# Patient Record
Sex: MALE | Race: WHITE | NOT HISPANIC OR LATINO | Employment: FULL TIME | URBAN - METROPOLITAN AREA
[De-identification: names, ages, dates, MRNs, and addresses within clinical notes are randomized per-mention and may not be internally consistent; named-entity substitution may affect disease eponyms.]

---

## 2017-04-11 ENCOUNTER — OFFICE VISIT (OUTPATIENT)
Dept: MEDICAL GROUP | Facility: MEDICAL CENTER | Age: 62
End: 2017-04-11
Payer: COMMERCIAL

## 2017-04-11 ENCOUNTER — TELEPHONE (OUTPATIENT)
Dept: MEDICAL GROUP | Facility: MEDICAL CENTER | Age: 62
End: 2017-04-11

## 2017-04-11 VITALS
SYSTOLIC BLOOD PRESSURE: 124 MMHG | OXYGEN SATURATION: 95 % | BODY MASS INDEX: 37.74 KG/M2 | HEIGHT: 63 IN | HEART RATE: 75 BPM | WEIGHT: 213 LBS | DIASTOLIC BLOOD PRESSURE: 78 MMHG | TEMPERATURE: 98.1 F

## 2017-04-11 DIAGNOSIS — J40 BRONCHITIS: ICD-10-CM

## 2017-04-11 DIAGNOSIS — J06.9 VIRAL URI WITH COUGH: ICD-10-CM

## 2017-04-11 DIAGNOSIS — E66.9 OBESITY, UNSPECIFIED OBESITY SEVERITY, UNSPECIFIED OBESITY TYPE: ICD-10-CM

## 2017-04-11 DIAGNOSIS — Z00.00 PREVENTATIVE HEALTH CARE: Chronic | ICD-10-CM

## 2017-04-11 DIAGNOSIS — Z23 NEED FOR ZOSTER VACCINATION: ICD-10-CM

## 2017-04-11 PROCEDURE — 99213 OFFICE O/P EST LOW 20 MIN: CPT | Performed by: INTERNAL MEDICINE

## 2017-04-11 RX ORDER — BENZONATATE 100 MG/1
100 CAPSULE ORAL 3 TIMES DAILY PRN
Qty: 30 CAP | Refills: 0 | Status: SHIPPED | OUTPATIENT
Start: 2017-04-11 | End: 2017-10-24

## 2017-04-11 RX ORDER — AZITHROMYCIN 250 MG/1
TABLET, FILM COATED ORAL
Qty: 6 TAB | Refills: 0 | Status: SHIPPED | OUTPATIENT
Start: 2017-04-11 | End: 2017-10-24

## 2017-04-11 RX ORDER — DOXYCYCLINE HYCLATE 100 MG
100 TABLET ORAL 2 TIMES DAILY
Qty: 20 TAB | Refills: 0 | Status: SHIPPED | OUTPATIENT
Start: 2017-04-11 | End: 2017-10-24

## 2017-04-11 ASSESSMENT — PATIENT HEALTH QUESTIONNAIRE - PHQ9: CLINICAL INTERPRETATION OF PHQ2 SCORE: 0

## 2017-04-11 NOTE — TELEPHONE ENCOUNTER
Please call patient, I did review his chart, he actually received the shingles vaccine here in 2014, he does not need to get it again

## 2017-04-11 NOTE — PROGRESS NOTES
Subjective:      Joaquin Escoto is a 61 y.o. male who presents with cough        HPI     Has had cough x 3 weeks, yellow sputum, no blood, some sob with activity, no chest pain, no fever or chills, decreased energy level,   No headaches, no body aches, some sinus congestion, no nausea or emesis, no diarrhea, has asthma and uses advair daily and only uses rescue inhaler with exericse, no usage of rescue inhaler in the last three weeks, no wheezing, no throat swelling  Cough is not worse at night, cough is not related to position or activity  Sick exposures at work  No recent travel, no recent antibiotics  Exercise 3-4 times per week and has    No tobacco  Dyslipidemia diet-controlled  No hypertension  No chest pain, shortness of breath, lightheaded, syncope, palpitation  No leg edema  No nausea or vomiting          Current Outpatient Prescriptions   Medication Sig Dispense Refill   • fluticasone-salmeterol (ADVAIR DISKUS) 100-50 MCG/DOSE AEROSOL POWDER, BREATH ACTIVATED Inhale 1 Puff by mouth 2 times a day. 60 Inhaler 3   • ciprofloxacin (CIPRO) 500 MG Tab Take 1 Tab by mouth 2 times a day. 20 Tab 0   • benzonatate (TESSALON) 100 MG Cap Take 1 Cap by mouth 3 times a day as needed. 30 Cap 0   • guaifenesin-codeine (TUSSI-ORGANIDIN NR) 100-10 MG/5ML syrup Take 5 mL by mouth 3 times a day as needed. 120 mL 0   • albuterol (VENTOLIN OR PROVENTIL) 108 (90 BASE) MCG/ACT Aero Soln inhalation aerosol Inhale 2 Puffs by mouth every 6 hours as needed for Shortness of Breath. 8.5 g 3   • aspirin (ASA) 81 MG CHEW Take 1 Tab by mouth every day. 100 Tab 11   • omeprazole (PRILOSEC) 20 MG CPDR Take 20 mg by mouth 2 times a day.       No current facility-administered medications for this visit.     Patient Active Problem List    Diagnosis Date Noted   • Polycythemia 04/26/2016   • Obesity 12/15/2015   • Sleep apnea    • Dyslipidemia 11/03/2012   • Mild intermittent asthma 10/23/2012   • Allergic rhinitis 10/23/2012    • S/P arthroscopy of left knee 10/23/2012   • S/P sinus surgery 10/23/2012   • Preventative health care 10/23/2012   • BPH (benign prostatic hypertrophy) 10/23/2012   • GERD (gastroesophageal reflux disease) 10/23/2012     Depression Screening    Little interest or pleasure in doing things?  0 - not at all  Feeling down, depressed , or hopeless? 0 - not at all  Patient Health Questionnaire Score: 0         ROS       Objective:          Physical Exam   Constitutional: He appears well-developed and well-nourished. No distress.   HENT:   Head: Normocephalic and atraumatic.   Right Ear: External ear normal.   Left Ear: External ear normal.   Mouth/Throat: Oropharynx is clear and moist.   Eyes: Conjunctivae are normal. Right eye exhibits no discharge. Left eye exhibits no discharge. No scleral icterus.   Neck: Neck supple. No JVD present.   Cardiovascular: Normal rate and regular rhythm.    No murmur heard.  Pulmonary/Chest: Effort normal and breath sounds normal. No respiratory distress. He has no wheezes.   Abdominal: He exhibits no distension.   Neurological: He is alert.   Skin: Skin is warm. He is not diaphoretic.   Psychiatric: He has a normal mood and affect. His behavior is normal.   Nursing note and vitals reviewed.   no sinus tenderness to palpation            Assessment/Plan:     Assessment  #1 bronchitis ×3 weeks with a history of underlying asthma, normal saturation, good air movement and air sounds lateral     #2 mild intermittent asthma stable on Advair, no use of rescue inhaler regularly    #3 obesity BMI 37.7, exercising but not following a good diet    #4 upcoming trip to [facility] would like antibiotics preventative    #5 dyslipidemia diet-controlled        Plan  #1 doxycycline ×10 days can cause nausea, vomiting, diarrhea, rash    #2 no need for chest x-ray at this time    #3 continue Advair    #4 Ventolin as needed    #5 no need for corticosteroids orally     #6 labs after resolution of symptoms  preventative    #7 exercise, weight loss discussed, recommended nutrition counseling he declines    #8 Zithromax for trip to [facility] take as directed has had before    #9 schedule follow-up annual exam 3 months    #10 continue no tobacco    #11 lifestyle modification, diet, exercise discussed

## 2017-04-11 NOTE — MR AVS SNAPSHOT
"        Joaquin Escoto   2017 2:40 PM   Office Visit   MRN: [id number]    Department:  [facility]   Dept Phone:  [phone]    Description:  Male : 1955   Provider:  Oj Estrada M.D.           Allergies as of 2017     Allergen Noted Reactions    Cashew Nut Oil 07/15/2014       Pcn [Penicillins] 10/23/2012   Anaphylaxis, Swelling      You were diagnosed with     Bronchitis   [599078]       Preventative health care   [610106]       Obesity, unspecified obesity severity, unspecified obesity type   [3638161]       Need for zoster vaccination   [461960]       Viral URI with cough   [756031]   use otc zyrtec and flonase daily.  flores w/cod and tessalon perles prn cough      Vital Signs     Blood Pressure Pulse Temperature Height Weight Body Mass Index    124/78 mmHg 75 36.7 °C (98.1 °F) 1.6 m (5' 2.99\") 96.616 kg (213 lb) 37.74 kg/m2    Oxygen Saturation Smoking Status                95% Former Smoker          Basic Information     Date Of Birth Sex Race Ethnicity Preferred Language    1955 Male White Non- English      Problem List              ICD-10-CM Priority Class Noted - Resolved    Mild intermittent asthma J45.20   10/23/2012 - Present    Allergic rhinitis    10/23/2012 - Present    S/P arthroscopy of left knee (Chronic) Z98.890   10/23/2012 - Present    S/P sinus surgery (Chronic) Z98.890   10/23/2012 - Present    Preventative health care (Chronic) Z00.00   10/23/2012 - Present    BPH (benign prostatic hypertrophy) (Chronic) N40.0   10/23/2012 - Present    GERD (gastroesophageal reflux disease) (Chronic) K21.9   10/23/2012 - Present    Dyslipidemia (Chronic) E78.5   11/3/2012 - Present    Sleep apnea G47.30   Unknown - Present    Obesity E66.9   12/15/2015 - Present    Polycythemia D75.1   2016 - Present      Health Maintenance        Date Due Completion Dates    IMM DTaP/Tdap/Td Vaccine (1 - Tdap) 1973 ---    IMM PNEUMOCOCCAL 19-64 (ADULT) MEDIUM RISK SERIES " (1 of 1 - PPSV23) 8/14/1973 ---    COLONOSCOPY 5/16/2019 5/16/2009 (Done)    Override on 5/16/2009: Done            Current Immunizations     Influenza TIV (IM) 10/23/2012    Influenza Vaccine Quad Inj (Pf) 12/15/2015  4:13 PM    SHINGLES VACCINE 7/15/2014      Below and/or attached are the medications your provider expects you to take. Review all of your home medications and newly ordered medications with your provider and/or pharmacist. Follow medication instructions as directed by your provider and/or pharmacist. Please keep your medication list with you and share with your provider. Update the information when medications are discontinued, doses are changed, or new medications (including over-the-counter products) are added; and carry medication information at all times in the event of emergency situations     Allergies:  CASHEW NUT OIL - (reactions not documented)     PCN - Anaphylaxis,Swelling               Medications  Valid as of: April 11, 2017 -  3:07 PM    Generic Name Brand Name Tablet Size Instructions for use    Albuterol Sulfate (Aero Soln) albuterol 108 (90 BASE) MCG/ACT Inhale 2 Puffs by mouth every 6 hours as needed for Shortness of Breath.        Aspirin (Chew Tab) ASA 81 MG Take 1 Tab by mouth every day.        Azithromycin (Tab) ZITHROMAX 250 MG 2 tabs by mouth day 1, 1 tab by mouth days 2-5        Benzonatate (Cap) TESSALON 100 MG Take 1 Cap by mouth 3 times a day as needed.        Doxycycline Hyclate (Tab) VIBRAMYCIN 100 MG Take 1 Tab by mouth 2 times a day.        Fluticasone-Salmeterol (AEROSOL POWDER, BREATH ACTIVATED) ADVAIR 100-50 MCG/DOSE Inhale 1 Puff by mouth 2 times a day.        Omeprazole (CAPSULE DELAYED RELEASE) PRILOSEC 20 MG Take 20 mg by mouth 2 times a day.        Zoster Vaccine Live (Recon Soln) ZOSTAVAX 79664 UNT/0.65ML Inject 0.65 mL as instructed Once for 1 dose.        .                 Medicines prescribed today were sent to:     [id number] - [place], [place] - [address]    [address] [place] [address]    Phone: [phone] Fax: [phone]    Open 24 Hours?: No      Medication refill instructions:       If your prescription bottle indicates you have medication refills left, it is not necessary to call your provider’s office. Please contact your pharmacy and they will refill your medication.    If your prescription bottle indicates you do not have any refills left, you may request refills at any time through one of the following ways: The online [employer] system (except Urgent Care), by calling your provider’s office, or by asking your pharmacy to contact your provider’s office with a refill request. Medication refills are processed only during regular business hours and may not be available until the next business day. Your provider may request additional information or to have a follow-up visit with you prior to refilling your medication.   *Please Note: Medication refills are assigned a new Rx number when refilled electronically. Your pharmacy may indicate that no refills were authorized even though a new prescription for the same medication is available at the pharmacy. Please request the medicine by name with the pharmacy before contacting your provider for a refill.        Your To Do List     Future Labs/Procedures Complete By Expires    CBC WITH DIFFERENTIAL  As directed 4/12/2018    COMP METABOLIC PANEL  As directed 4/12/2018    HEMOGLOBIN A1C  As directed 4/11/2018    LIPID PROFILE  As directed 4/12/2018    PROSTATE SPECIFIC AG SCREENING  As directed 4/12/2018    TSH  As directed 4/12/2018    URINALYSIS,CULTURE IF INDICATED  As directed 4/12/2018    VITAMIN D,25 HYDROXY  As directed 4/12/2018      Other Notes About Your Plan        12/15/15 check blood pressure and record    9/16 next labs cbc,EPO,jak2 ,a1c,lipid             [employer] Access Code: Activation code not generated  Current [employer] Status: Active

## 2017-10-21 LAB
25(OH)D3+25(OH)D2 SERPL-MCNC: 26.2 NG/ML (ref 30–100)
ALBUMIN SERPL-MCNC: 4.1 G/DL (ref 3.6–4.8)
ALBUMIN/GLOB SERPL: 1.8 {RATIO} (ref 1.2–2.2)
ALP SERPL-CCNC: 75 IU/L (ref 39–117)
ALT SERPL-CCNC: 27 IU/L (ref 0–44)
APPEARANCE UR: CLEAR
AST SERPL-CCNC: 19 IU/L (ref 0–40)
BACTERIA #/AREA URNS HPF: ABNORMAL /[HPF]
BACTERIA UR CULT: NO GROWTH
BACTERIA UR CULT: NORMAL
BASOPHILS # BLD AUTO: 0 X10E3/UL (ref 0–0.2)
BASOPHILS NFR BLD AUTO: 0 %
BILIRUB SERPL-MCNC: 0.5 MG/DL (ref 0–1.2)
BILIRUB UR QL STRIP: NEGATIVE
BUN SERPL-MCNC: 24 MG/DL (ref 8–27)
BUN/CREAT SERPL: 20 (ref 10–24)
CALCIUM SERPL-MCNC: 8.8 MG/DL (ref 8.6–10.2)
CASTS URNS MICRO: ABNORMAL
CASTS URNS QL MICRO: ABNORMAL
CHLORIDE SERPL-SCNC: 101 MMOL/L (ref 96–106)
CHOLEST SERPL-MCNC: 219 MG/DL (ref 100–199)
CO2 SERPL-SCNC: 22 MMOL/L (ref 18–29)
COLOR UR: YELLOW
COMMENT 011824: ABNORMAL
CREAT SERPL-MCNC: 1.21 MG/DL (ref 0.76–1.27)
CRYSTALS URNS MICRO: ABNORMAL
EOSINOPHIL # BLD AUTO: 0.1 X10E3/UL (ref 0–0.4)
EOSINOPHIL NFR BLD AUTO: 2 %
EPI CELLS #/AREA URNS HPF: >10 /HPF
ERYTHROCYTE [DISTWIDTH] IN BLOOD BY AUTOMATED COUNT: 13.6 % (ref 12.3–15.4)
GFR SERPLBLD CREATININE-BSD FMLA CKD-EPI: 63 ML/MIN/1.73
GFR SERPLBLD CREATININE-BSD FMLA CKD-EPI: 73 ML/MIN/1.73
GLOBULIN SER CALC-MCNC: 2.3 G/DL (ref 1.5–4.5)
GLUCOSE SERPL-MCNC: 113 MG/DL (ref 65–99)
GLUCOSE UR QL: NEGATIVE
HBA1C MFR BLD: 6 % (ref 4.8–5.6)
HCT VFR BLD AUTO: 51.7 % (ref 37.5–51)
HDLC SERPL-MCNC: 41 MG/DL
HGB BLD-MCNC: 18.3 G/DL (ref 12.6–17.7)
HGB UR QL STRIP: ABNORMAL
IMM GRANULOCYTES # BLD: 0 X10E3/UL (ref 0–0.1)
IMM GRANULOCYTES NFR BLD: 0 %
IMMATURE CELLS  115398: ABNORMAL
KETONES UR QL STRIP: NEGATIVE
LDLC SERPL CALC-MCNC: 146 MG/DL (ref 0–99)
LEUKOCYTE ESTERASE UR QL STRIP: NEGATIVE
LYMPHOCYTES # BLD AUTO: 2 X10E3/UL (ref 0.7–3.1)
LYMPHOCYTES NFR BLD AUTO: 33 %
MCH RBC QN AUTO: 31.9 PG (ref 26.6–33)
MCHC RBC AUTO-ENTMCNC: 35.4 G/DL (ref 31.5–35.7)
MCV RBC AUTO: 90 FL (ref 79–97)
MICRO URNS: ABNORMAL
MICRO URNS: ABNORMAL
MONOCYTES # BLD AUTO: 0.4 X10E3/UL (ref 0.1–0.9)
MONOCYTES NFR BLD AUTO: 6 %
MORPHOLOGY BLD-IMP: ABNORMAL
MUCOUS THREADS URNS QL MICRO: PRESENT
NEUTROPHILS # BLD AUTO: 3.6 X10E3/UL (ref 1.4–7)
NEUTROPHILS NFR BLD AUTO: 59 %
NITRITE UR QL STRIP: NEGATIVE
NRBC BLD AUTO-RTO: ABNORMAL %
PH UR STRIP: 6 [PH] (ref 5–7.5)
PLATELET # BLD AUTO: 193 X10E3/UL (ref 150–379)
POTASSIUM SERPL-SCNC: 4.2 MMOL/L (ref 3.5–5.2)
PROT SERPL-MCNC: 6.4 G/DL (ref 6–8.5)
PROT UR QL STRIP: NEGATIVE
PSA SERPL-MCNC: 2.1 NG/ML (ref 0–4)
RBC # BLD AUTO: 5.74 X10E6/UL (ref 4.14–5.8)
RBC #/AREA URNS HPF: >30 /HPF
RENAL EPI CELLS #/AREA URNS HPF: ABNORMAL /[HPF]
SODIUM SERPL-SCNC: 139 MMOL/L (ref 134–144)
SP GR UR: 1.03 (ref 1–1.03)
T VAGINALIS URNS QL MICRO: ABNORMAL
TRIGL SERPL-MCNC: 160 MG/DL (ref 0–149)
TSH SERPL DL<=0.005 MIU/L-ACNC: 2.5 UIU/ML (ref 0.45–4.5)
UNIDENT CRYS URNS QL MICRO: PRESENT
URINALYSIS REFLEX  377202: ABNORMAL
URNS CMNT MICRO: ABNORMAL
UROBILINOGEN UR STRIP-MCNC: 0.2 MG/DL (ref 0.2–1)
VLDLC SERPL CALC-MCNC: 32 MG/DL (ref 5–40)
WBC # BLD AUTO: 6.2 X10E3/UL (ref 3.4–10.8)
WBC #/AREA URNS HPF: >30 /HPF
YEAST #/AREA URNS HPF: ABNORMAL /[HPF]

## 2017-10-23 PROBLEM — R73.01 IMPAIRED FASTING GLUCOSE: Status: ACTIVE | Noted: 2017-10-23

## 2017-10-24 ENCOUNTER — HOSPITAL ENCOUNTER (OUTPATIENT)
Facility: MEDICAL CENTER | Age: 62
End: 2017-10-24
Attending: INTERNAL MEDICINE
Payer: COMMERCIAL

## 2017-10-24 ENCOUNTER — TELEPHONE (OUTPATIENT)
Dept: MEDICAL GROUP | Facility: MEDICAL CENTER | Age: 62
End: 2017-10-24

## 2017-10-24 ENCOUNTER — OFFICE VISIT (OUTPATIENT)
Dept: MEDICAL GROUP | Facility: MEDICAL CENTER | Age: 62
End: 2017-10-24
Payer: COMMERCIAL

## 2017-10-24 VITALS
OXYGEN SATURATION: 95 % | BODY MASS INDEX: 38.45 KG/M2 | TEMPERATURE: 98 F | DIASTOLIC BLOOD PRESSURE: 86 MMHG | HEART RATE: 72 BPM | WEIGHT: 217 LBS | SYSTOLIC BLOOD PRESSURE: 136 MMHG | HEIGHT: 63 IN

## 2017-10-24 DIAGNOSIS — R73.01 IMPAIRED FASTING GLUCOSE: ICD-10-CM

## 2017-10-24 DIAGNOSIS — E78.5 DYSLIPIDEMIA: Chronic | ICD-10-CM

## 2017-10-24 DIAGNOSIS — R42 DIZZINESS: ICD-10-CM

## 2017-10-24 DIAGNOSIS — K21.9 GASTROESOPHAGEAL REFLUX DISEASE WITHOUT ESOPHAGITIS: Chronic | ICD-10-CM

## 2017-10-24 DIAGNOSIS — Z12.11 COLON CANCER SCREENING: ICD-10-CM

## 2017-10-24 DIAGNOSIS — R06.02 SOB (SHORTNESS OF BREATH): ICD-10-CM

## 2017-10-24 DIAGNOSIS — J45.20 MILD INTERMITTENT ASTHMA WITHOUT COMPLICATION: ICD-10-CM

## 2017-10-24 DIAGNOSIS — G47.30 SLEEP APNEA, UNSPECIFIED TYPE: ICD-10-CM

## 2017-10-24 DIAGNOSIS — R31.29 HEMATURIA, MICROSCOPIC: ICD-10-CM

## 2017-10-24 DIAGNOSIS — E66.9 CLASS 2 OBESITY WITHOUT SERIOUS COMORBIDITY WITH BODY MASS INDEX (BMI) OF 38.0 TO 38.9 IN ADULT, UNSPECIFIED OBESITY TYPE: ICD-10-CM

## 2017-10-24 DIAGNOSIS — D75.1 POLYCYTHEMIA: ICD-10-CM

## 2017-10-24 PROCEDURE — 99214 OFFICE O/P EST MOD 30 MIN: CPT | Performed by: INTERNAL MEDICINE

## 2017-10-24 NOTE — TELEPHONE ENCOUNTER
I would recommend calling [facility] [phone] to see how we can best order the echocardiogram and treadmill stress test.  Would this be done through [facility]?  Or can this be done as an outpatient through the cardiology office at [facility]?    Where should we fax the orders for the echocardiogram and treadmill stress test?

## 2017-10-24 NOTE — TELEPHONE ENCOUNTER
Please call the patient, I saw him earlier today and ordered a treadmill stress test and echocardiogram, his insurance does not cover [facility].     Please ask him what imaging center is covered under his insurance ([facility]? ).

## 2017-10-24 NOTE — PROGRESS NOTES
Subjective:      Joaquin Escoto is a 62 y.o. male who presents with lab review  Follow-Up            HPI   Here to review labs results 10/23/17  Has been using advair daily no use of rescue inhaler, no cough or sob, no wheezing, no sputum, fever or chills, not exercising but does eat sweets, soda one per day and mocha coffee.  No sore throat, no postnasal drip or allergies.  Not able to exercise because of business, has not exercised a regular basis for 6 months, previous stress testing done 3 years ago negative  Dyslipidemia diet-controlled, impaired fasting blood sugar, no history of hypertension  Has not had influenza vaccine   Occasional snoring in the past, has declined evaluation for sleep apnea, occasional daytime fatigue  Has some dizziness with standing sometimes going from sitting to standing position happens rarely, does not occur all the time usually only lasting a few seconds, no history of arrhythmia, no associated chest pain, no sob, no cough, no headache with position change, no lightheadedness, dizziness, palpitations, chest pain with exercise   Occasional shortness of breath with activity, does not happen all the time  Left first finger cyst  No tobacco, no alcohol  No dysuria, hematuria, urgency    Current Outpatient Prescriptions   Medication Sig Dispense Refill   • fluticasone-salmeterol (ADVAIR DISKUS) 100-50 MCG/DOSE AEROSOL POWDER, BREATH ACTIVATED Inhale 1 Puff by mouth 2 times a day. 60 Inhaler 6   • omeprazole (PRILOSEC) 20 MG CPDR Take 20 mg by mouth 2 times a day.     • doxycycline (VIBRAMYCIN) 100 MG Tab Take 1 Tab by mouth 2 times a day. 20 Tab 0   • azithromycin (ZITHROMAX Z-GEORGE) 250 MG Tab 2 tabs by mouth day 1, 1 tab by mouth days 2-5 6 Tab 0   • benzonatate (TESSALON) 100 MG Cap Take 1 Cap by mouth 3 times a day as needed. 30 Cap 0   • albuterol (VENTOLIN OR PROVENTIL) 108 (90 BASE) MCG/ACT Aero Soln inhalation aerosol Inhale 2 Puffs by mouth every 6 hours as needed for Shortness  of Breath. 8.5 g 3   • aspirin (ASA) 81 MG CHEW Take 1 Tab by mouth every day. 100 Tab 11     No current facility-administered medications for this visit.      Patient Active Problem List    Diagnosis Date Noted   • Impaired fasting glucose 10/23/2017   • Polycythemia 04/26/2016   • Obesity 12/15/2015   • Sleep apnea    • Dyslipidemia 11/03/2012   • Mild intermittent asthma 10/23/2012   • Allergic rhinitis 10/23/2012   • S/P arthroscopy of left knee 10/23/2012   • S/P sinus surgery 10/23/2012   • Preventative health care 10/23/2012   • BPH (benign prostatic hypertrophy) 10/23/2012   • GERD (gastroesophageal reflux disease) 10/23/2012         Allergic rhinitis  2007 did see  for allergy testing     BPH  Saw dr.garey aldrich urology had microwave procedure   3/8/13 psa 2.5 per urology note  5/1/14 psa 1.8  6/11/15 urology [place] note, s/p TUMT several years ago, psa stable, digital rectal exam normal, followup one year  4/26/16 psa 2.7  10/23/17 psa 2.1     Dyslipidemia  6/09 exercise thallium at [facility], no ischemia, EF 63%  4/11 chol 220,trig 204,hdl 41,ldl 138  5/1/14 chol 191,trig 76,hdl 46,ldl 130,LDL-P 1303,HDL-P 27.9,LP-IR 49; CRP 1.2, Lp(a) 67, urine mac < 0.5  5/16/14 [facility]; treadmill stress test 85% maximum predicted heart rate, no ST depression, solitary PVC, no arrhythmia, negative stress test  5/16/14 CT cardiac scoring; LMA-0, LCX-1.7, LAD 10.8, RCA-6.3, PDA-0; total calcium score 18.8  5/16/15 carotid ultrasound <50% stenosis  5/16/14 DES negative  5/16/14 ultrasound aorta negative  4/26/16 chol 219,trig 134,hdl 42,ldl 150  10/23/17 chol 219,trig 160,hdl 41,ldl 146      Gastroesophageal reflux  On prilosec daily  3/10 EGD per [facility]     Impaired fasting blood sugar  10/23/17 A1c 6.%,bs 113     Mild intermittent asthma    Polycythemia  5/1/14 hgb 18,hct 53.2  4/26/16 hgb 18,hct 51.7  10/23/17 hgb 18,hct 51.7     Preventative health  11/24/08 colon [facility] polyp  "hyperplastic, repeat 10 years  2010 pneumovax no webiz  2/7/12 tdap at [facility]   8/9/12 hep a and b series completed  5/2/14 FIT neg  7/15/14 zoster vaccine  10/23/17 psa 2.1  10/23/17 vit d 26 start 2000 units     Left knee arthroscopy 2010     Sleep apnea  had ENT surgery/uvulectomy  7/21/14 apnea link AHI 5,RI 7,hypopnea index 5, evaluation over 5 hours 44 min  8/7/14 patient declines sleep study     s/p sinus surgery      ROS       Objective:     /86   Pulse 72   Temp 36.7 °C (98 °F)   Ht 1.6 m (5' 3\")   Wt 98.4 kg (217 lb)   SpO2 95%   BMI 38.44 kg/m²      Physical Exam   Constitutional: He appears well-developed and well-nourished. No distress.   HENT:   Head: Normocephalic and atraumatic.   Right Ear: External ear normal.   Left Ear: External ear normal.   Eyes: Conjunctivae are normal. Right eye exhibits no discharge. Left eye exhibits no discharge. No scleral icterus.   Neck: Neck supple. No JVD present.   Cardiovascular: Normal rate and regular rhythm.    Pulmonary/Chest: Effort normal and breath sounds normal. No respiratory distress.   Abdominal: He exhibits no distension.   Musculoskeletal: He exhibits no edema.   Neurological: He is alert.   Skin: Skin is warm. He is not diaphoretic.   Psychiatric: He has a normal mood and affect. His behavior is normal.   Nursing note and vitals reviewed.    Left first finger ganglion cyst          Assessment/Plan:     Assessment  #! DyslipidemiaDiet-controlled 10/23/17 chol 219,trig 160,hdl 41,ldl 146      #2 Impaired fasting blood sugar 10/23/17 A1c 6.%,bs 113 diet-controlled     #3 Mild intermittent asthma occasional shortness of breath with activity, no use of breast inhaler    #4 Polycythemia 10/23/17 hgb 18,hct 51.7 question sleep apnea, pickwickian, no history of CHF or cor pulmonale     #5 consider sleep apnea 7/21/14 apnea link AHI 5,RI 7,hypopnea index 5, evaluation over 5 hours 44 min, history of snoring, daytime fatigue    #6 BMI 38.4    #7 " left first finger ganglion cyst    #8 dizziness when clear from sitting to standing position    #9 occasional shortness of breath with activity consider related to asthma, deconditioning, cardiovascular disease      Plan  #! Declines nutrition consultaion    #2 importance of diet and exercise and weight loss discussed, patient understands that     #3 increase vit d by 2000 units per day    #4 apnea link    #5 repeat UA his urinalysis was negative on microscopic revealed rbc and wbc, asymptomatic with regards to urinary symptoms    #6 echo shortness of breath    #7 chest x-ray    #8 treadmill stress test shortness of breath with cardiovascular risk factors    #9 left finger ganglion cyst, can refer to orthopedics but would be cosmetic, he declines    #10 check blood pressure regularly and record; orthostatic precautions    #11 he will get influenza vaccine at pharmacy we are out    #12 continue Advair    #13 reviewed laboratory testing from October 23    #14 follow-up 4 weeks    #15 FIT card provided    #16 repeat A1c 3 months

## 2017-10-24 NOTE — TELEPHONE ENCOUNTER
Spoke with pt, he must use [facility]. Pt advised that they will be rerouted to [facility] and they should notify him within about 5 days, if not he is to call office and we will check on them.

## 2017-10-25 NOTE — TELEPHONE ENCOUNTER
Noted, two separate orders written and faxed to the individual fax numbers for a treadmill stress test and for the echocardiogram at [facility]

## 2017-10-25 NOTE — TELEPHONE ENCOUNTER
Spoke with [facility]. If stress test is for an exercise test, send to fax: [phone]    For echocardiogram and a nuclear med stress test, send to fax: [phone]

## 2017-10-26 LAB
BACTERIA UR CULT: NO GROWTH
BACTERIA UR CULT: NORMAL

## 2017-10-27 LAB
APPEARANCE UR: NORMAL
BILIRUB UR QL STRIP: NORMAL
COLOR UR: NORMAL
GLUCOSE UR QL: NORMAL
HGB UR QL STRIP: NORMAL
KETONES UR QL STRIP: NORMAL
LEUKOCYTE ESTERASE UR QL STRIP: NORMAL
MICRO URNS: NORMAL
NITRITE UR QL STRIP: NORMAL
PH UR STRIP: NORMAL [PH]
PROT UR QL STRIP: NORMAL
REQUEST PROBLEM   100552: NORMAL
SP GR UR: NORMAL
UROBILINOGEN UR STRIP-MCNC: NORMAL MG/DL

## 2017-11-06 ENCOUNTER — HOSPITAL ENCOUNTER (OUTPATIENT)
Facility: MEDICAL CENTER | Age: 62
End: 2017-11-06
Attending: INTERNAL MEDICINE
Payer: COMMERCIAL

## 2017-11-06 PROCEDURE — 82274 ASSAY TEST FOR BLOOD FECAL: CPT

## 2017-11-09 ENCOUNTER — TELEPHONE (OUTPATIENT)
Dept: MEDICAL GROUP | Facility: MEDICAL CENTER | Age: 62
End: 2017-11-09

## 2017-11-09 DIAGNOSIS — Z12.11 COLON CANCER SCREENING: ICD-10-CM

## 2017-11-09 LAB — HEMOCCULT STL QL IA: NEGATIVE

## 2017-11-10 ENCOUNTER — TELEPHONE (OUTPATIENT)
Dept: MEDICAL GROUP | Facility: MEDICAL CENTER | Age: 62
End: 2017-11-10

## 2017-11-10 NOTE — TELEPHONE ENCOUNTER
----- Message from Oj Estrada M.D. sent at 11/9/2017 11:08 PM PST -----  Please notify patient that the stool test is negative

## 2017-11-15 ENCOUNTER — TELEPHONE (OUTPATIENT)
Dept: MEDICAL GROUP | Facility: MEDICAL CENTER | Age: 62
End: 2017-11-15

## 2017-11-15 NOTE — TELEPHONE ENCOUNTER
Called patient with results [facility] echo and stress test, echo normal LV function, stress test negative, did have hypertensive response, he will check his blood pressures daily and record, bring those readings to me in 4 weeks, he will start exercising as well as, hold off on blood pressure medications until we can track his blood pressure readings.  He understands and agrees his follow-up appointment with me 4 weeks  Order sent to [facility] for the x-ray, he states they did not receive that, we will fax that again and also mail him an order for the x-ray slip

## 2017-12-20 PROBLEM — R06.00 DYSPNEA: Status: ACTIVE | Noted: 2017-12-20

## 2018-02-13 ENCOUNTER — OFFICE VISIT (OUTPATIENT)
Dept: MEDICAL GROUP | Facility: MEDICAL CENTER | Age: 63
End: 2018-02-13

## 2018-02-13 VITALS
OXYGEN SATURATION: 94 % | DIASTOLIC BLOOD PRESSURE: 82 MMHG | TEMPERATURE: 98.6 F | HEART RATE: 85 BPM | SYSTOLIC BLOOD PRESSURE: 130 MMHG

## 2018-02-13 DIAGNOSIS — E78.5 DYSLIPIDEMIA: Chronic | ICD-10-CM

## 2018-02-13 DIAGNOSIS — K21.9 GASTROESOPHAGEAL REFLUX DISEASE WITHOUT ESOPHAGITIS: ICD-10-CM

## 2018-02-13 DIAGNOSIS — R31.29 MICROSCOPIC HEMATURIA: ICD-10-CM

## 2018-02-13 DIAGNOSIS — E55.9 HYPOVITAMINOSIS D: ICD-10-CM

## 2018-02-13 DIAGNOSIS — G47.30 SLEEP APNEA, UNSPECIFIED TYPE: ICD-10-CM

## 2018-02-13 DIAGNOSIS — D75.1 POLYCYTHEMIA: ICD-10-CM

## 2018-02-13 DIAGNOSIS — I10 ESSENTIAL HYPERTENSION: ICD-10-CM

## 2018-02-13 DIAGNOSIS — R73.01 IMPAIRED FASTING GLUCOSE: ICD-10-CM

## 2018-02-13 DIAGNOSIS — N40.0 BENIGN PROSTATIC HYPERPLASIA WITHOUT LOWER URINARY TRACT SYMPTOMS: Chronic | ICD-10-CM

## 2018-02-13 DIAGNOSIS — E53.8 B12 DEFICIENCY: ICD-10-CM

## 2018-02-13 PROCEDURE — 99214 OFFICE O/P EST MOD 30 MIN: CPT | Performed by: INTERNAL MEDICINE

## 2018-02-13 RX ORDER — OMEPRAZOLE 20 MG/1
20 CAPSULE, DELAYED RELEASE ORAL DAILY
Qty: 90 CAP | Refills: 3 | Status: SHIPPED | OUTPATIENT
Start: 2018-02-13 | End: 2019-03-26 | Stop reason: SDUPTHER

## 2018-02-13 RX ORDER — LISINOPRIL 10 MG/1
10 TABLET ORAL DAILY
Qty: 30 TAB | Refills: 6 | Status: SHIPPED | OUTPATIENT
Start: 2018-02-13 | End: 2018-04-09 | Stop reason: SDUPTHER

## 2018-02-13 NOTE — PROGRESS NOTES
Subjective:      Joaquin Escoto is a 62 y.o. male who presents with Hypertension (follow up)            HPI     Here for follow up blood pressure, has been trying to improve diet and not exercising. Did not get OPO done and instead would like to go directly to sleep apnea evaluation, has [place]'s insurance.  Patient's wife has told him that he snores, has been evaluated for sleep apnea previously status post UPPP.  No daytime fatigue.  Has been checking blood pressures daily, running 130s to 140s over 80s to 90s consistently, checking daily for the last month, no headache, no vision changes, no chest pain, no shortness of breath, no lightheadedness or syncope.  No increased stress.  Mild intermittent asthma stable on Advair, no cough or productive sputum, no wheezing.  Reflux on omeprazole taking daily no breakthrough symptoms of heartburn.  No regular NSAIDs  BPH followed by urology Dr. Mendenhall  Had exercise treadmill test negative and echocardiogram in November  Impaired fasting blood sugar working on decreasing sweets and candies, working on diet, has lost 7 pounds since the start of the year  No tobacco      Current Outpatient Prescriptions   Medication Sig Dispense Refill   • fluticasone-salmeterol (ADVAIR DISKUS) 100-50 MCG/DOSE AEROSOL POWDER, BREATH ACTIVATED Inhale 1 Puff by mouth 2 times a day. 60 Inhaler 6   • omeprazole (PRILOSEC) 20 MG CPDR Take 20 mg by mouth 2 times a day.     • albuterol (VENTOLIN OR PROVENTIL) 108 (90 BASE) MCG/ACT Aero Soln inhalation aerosol Inhale 2 Puffs by mouth every 6 hours as needed for Shortness of Breath. 8.5 g 3   • aspirin (ASA) 81 MG CHEW Take 1 Tab by mouth every day. 100 Tab 11     No current facility-administered medications for this visit.      Patient Active Problem List   Diagnosis   • Mild intermittent asthma   • Allergic rhinitis   • S/P arthroscopy of left knee   • S/P sinus surgery   • Preventative health care   • BPH (benign prostatic hypertrophy)   •  GERD (gastroesophageal reflux disease)   • Dyslipidemia   • Sleep apnea   • Obesity   • Polycythemia   • Impaired fasting glucose   • Dyspnea         Allergic rhinitis  2007 did see  for allergy testing     BPH  Saw dr.garey aldrich urology had microwave procedure   3/8/13 psa 2.5 per urology note  5/1/14 psa 1.8  6/11/15 urology [place] note, s/p TUMT several years ago, psa stable, digital rectal exam normal, followup one year  4/26/16 psa 2.7  10/23/17 psa 2.1    dyspnea  10/24/17 apnea link, UA repeat consider renal ultrasound if RBC persists, patient does complain of occasional shortness of breath, chest x-ray, echo, treadmill stress test ordered  11/14/17 exercise treadmill stress test done at [facility] good exercise tolerance, no ST-T wave abnormalities, hypertensive response at baseline and with exercise, no chest pain, clive protocol 12 minutes  11/14/17 echo done at [facility] normal left ventricle size and function, EF 50-55%, impaired diastolic filling, no significant valvular disease     Dyslipidemia  6/09 exercise thallium at [facility] [place], no ischemia, EF 63%  4/11 chol 220,trig 204,hdl 41,ldl 138  5/1/14 chol 191,trig 76,hdl 46,ldl 130,LDL-P 1303,HDL-P 27.9,LP-IR 49; CRP 1.2, Lp(a) 67, urine mac < 0.5  5/16/14 [facility] physical; treadmill stress test 85% maximum predicted heart rate, no ST depression, solitary PVC, no arrhythmia, negative stress test  5/16/14 CT cardiac scoring; LMA-0, LCX-1.7, LAD 10.8, RCA-6.3, PDA-0; total calcium score 18.8  5/16/15 carotid ultrasound <50% stenosis  5/16/14 DES negative  5/16/14 ultrasound aorta negative  4/26/16 chol 219,trig 134,hdl 42,ldl 150  10/23/17 chol 219,trig 160,hdl 41,ldl 146     Gastroesophageal reflux  On prilosec daily  3/10 EGD per [facility]      Impaired fasting blood sugar  10/23/17 A1c 6.%,bs 113     Mild intermittent asthma  2008 on advair  10/24/17 on advair 250/50 bid      Polycythemia  5/1/14 hgb 18,hct 53.2  4/26/16 hgb 18,hct  51.7  10/23/17 hgb 18,hct 51.7  11/14/17 echo done at [facility] normal left ventricle size and function, EF 50-55%, impaired diastolic filling, no significant valvular disease     Preventative health  11/24/08 colon [facility] polyp hyperplastic, repeat 10 years  2010 pneumovax no webiz  2/7/12 tdap at [facility]   8/9/12 hep a and b series completed  7/15/14 zoster vaccine  10/23/17 psa 2.1  10/23/17 vit d 26 start 2000 units  11/9/17 FIT negative     Left knee arthroscopy 2010     Sleep apnea  had ENT surgery/uvulectomy  7/21/14 apnea link AHI 5,RI 7,hypopnea index 5, evaluation over 5 hours 44 min  8/7/14 patient declines sleep study     s/p sinus surgery           Health Maintenance Summary                IMM DTaP/Tdap/Td Vaccine Overdue 8/14/1973     IMM PNEUMOCOCCAL 19-64 (ADULT) MEDIUM RISK SERIES Overdue 8/14/1973     IMM INFLUENZA Overdue 9/1/2017      Done 12/15/2015 Imm Admin: Influenza Vaccine Quad Inj (Pf)     Patient has more history with this topic...    COLONOSCOPY Next Due 5/16/2019      Done 5/16/2009           Patient Care Team:  Oj Estrada M.D. as PCP - General    ROS       Objective:     /82   Pulse 85   Temp 37 °C (98.6 °F)   SpO2 94%      Physical Exam   Constitutional: He appears well-developed and well-nourished. No distress.   HENT:   Head: Normocephalic and atraumatic.   Eyes: Conjunctivae are normal. Right eye exhibits no discharge. Left eye exhibits no discharge. No scleral icterus.   Neck: No JVD present. No thyromegaly present.   Cardiovascular: Normal rate, regular rhythm and normal heart sounds.    Pulmonary/Chest: Effort normal and breath sounds normal. No respiratory distress. He has no wheezes.   Abdominal: He exhibits no distension.   Musculoskeletal: He exhibits no edema.   Neurological: He is alert.   Skin: Skin is warm. He is not diaphoretic.   Psychiatric: He has a normal mood and affect. His behavior is normal.   Nursing note and vitals reviewed.               Assessment/Plan:     Assessment  #!  Hypertension blood pressures still running high, patient has not started exercise program yet, has started his diet, and has lost 7 pounds, recent echo and treadmill stress test done negative    #2 impaired fasting blood sugar    #3 sleep apnea status post uvppp in the past, 1000 that he still snores    #4 obesity declined to be weighed today    #5 polycythemia possibly related to sleep apnea and obesity    #6 reflux stable on Prilosec    #7 asthma stable on Advair    #8 possible B-12 and vitamin D deficiency secondary to Prilosec, has had low vitamin D previously        Plan  #! Start lisinopril 10 mg monitor for lightheadedness, dizziness, dry cough, medication can affect renal function, most recent labs October normal renal function, will repeat blood tests after starting medication    #2 sleep referral [facility]    #3 recheck labs, slip provided to be done in one month    #4 send us blood pressure reading 2 weeks update, continue to check daily    #5 follow-up 3 months    #6 nutrition, diet, exercise, weight loss discussed, obesity increases risk for diabetes, cardiovascular disease    #7 nutrition and exercise discussed, recommended exercising 30 minutes daily    #8 patient will get influenza vaccine at pharmacy we are out    #9 refill Prilosec, check B-12 and vitamin D    #10 consider statin

## 2018-03-06 ENCOUNTER — OFFICE VISIT (OUTPATIENT)
Dept: MEDICAL GROUP | Facility: MEDICAL CENTER | Age: 63
End: 2018-03-06

## 2018-03-06 VITALS
SYSTOLIC BLOOD PRESSURE: 124 MMHG | HEART RATE: 72 BPM | TEMPERATURE: 97.2 F | DIASTOLIC BLOOD PRESSURE: 84 MMHG | WEIGHT: 217 LBS | OXYGEN SATURATION: 94 % | BODY MASS INDEX: 38.45 KG/M2 | HEIGHT: 63 IN | RESPIRATION RATE: 16 BRPM

## 2018-03-06 DIAGNOSIS — J10.1 INFLUENZA B: ICD-10-CM

## 2018-03-06 PROBLEM — J06.9 VIRAL UPPER RESPIRATORY TRACT INFECTION: Status: ACTIVE | Noted: 2018-03-06

## 2018-03-06 LAB
FLUAV+FLUBV AG SPEC QL IA: NORMAL
INT CON NEG: NORMAL
INT CON POS: NORMAL

## 2018-03-06 PROCEDURE — 99214 OFFICE O/P EST MOD 30 MIN: CPT | Performed by: FAMILY MEDICINE

## 2018-03-06 PROCEDURE — 87804 INFLUENZA ASSAY W/OPTIC: CPT | Performed by: FAMILY MEDICINE

## 2018-03-06 RX ORDER — OSELTAMIVIR PHOSPHATE 75 MG/1
75 CAPSULE ORAL 2 TIMES DAILY
Qty: 10 CAP | Refills: 0 | Status: SHIPPED | OUTPATIENT
Start: 2018-03-06 | End: 2018-04-09

## 2018-03-06 RX ORDER — CODEINE PHOSPHATE/GUAIFENESIN 10-100MG/5
5 LIQUID (ML) ORAL 3 TIMES DAILY PRN
Qty: 160 ML | Refills: 0 | Status: SHIPPED | OUTPATIENT
Start: 2018-03-06 | End: 2018-03-16

## 2018-03-06 NOTE — PATIENT INSTRUCTIONS
Oseltamivir capsules  What is this medicine?  OSELTAMIVIR (os el GUERIN i vir) is an antiviral medicine. It is used to prevent and to treat some kinds of influenza or the flu. It will not work for colds or other viral infections.  This medicine may be used for other purposes; ask your health care provider or pharmacist if you have questions.  COMMON BRAND NAME(S): Tamiflu  What should I tell my health care provider before I take this medicine?  They need to know if you have any of the following conditions:  -heart disease  -immune system problems  -kidney disease  -liver disease  -lung disease  -an unusual or allergic reaction to oseltamivir, other medicines, foods, dyes, or preservatives  -pregnant or trying to get pregnant  -breast-feeding  How should I use this medicine?  Take this medicine by mouth with a glass of water. Follow the directions on the prescription label. Start this medicine at the first sign of flu symptoms. You can take it with or without food. If it upsets your stomach, take it with food. Take your medicine at regular intervals. Do not take your medicine more often than directed. Take all of your medicine as directed even if you think you are better. Do not skip doses or stop your medicine early.  Talk to your pediatrician regarding the use of this medicine in children. While this drug may be prescribed for children as young as 14 days for selected conditions, precautions do apply.  Overdosage: If you think you have taken too much of this medicine contact a poison control center or emergency room at once.  NOTE: This medicine is only for you. Do not share this medicine with others.  What if I miss a dose?  If you miss a dose, take it as soon as you remember. If it is almost time for your next dose (within 2 hours), take only that dose. Do not take double or extra doses.  What may interact with this medicine?  Interactions are not expected.  This list may not describe all possible interactions. Give  your health care provider a list of all the medicines, herbs, non-prescription drugs, or dietary supplements you use. Also tell them if you smoke, drink alcohol, or use illegal drugs. Some items may interact with your medicine.  What should I watch for while using this medicine?  Visit your doctor or health care professional for regular check ups. Tell your doctor if your symptoms do not start to get better or if they get worse.  If you have the flu, you may be at an increased risk of developing seizures, confusion, or abnormal behavior. This occurs early in the illness, and more frequently in children and teens. These events are not common, but may result in accidental injury to the patient. Families and caregivers of patients should watch for signs of unusual behavior and contact a doctor or health care professional right away if the patient shows signs of unusual behavior.  This medicine is not a substitute for the flu shot. Talk to your doctor each year about an annual flu shot.  What side effects may I notice from receiving this medicine?  Side effects that you should report to your doctor or health care professional as soon as possible:  -allergic reactions like skin rash, itching or hives, swelling of the face, lips, or tongue  -anxiety, confusion, unusual behavior  -breathing problems  -hallucination, loss of contact with reality  -redness, blistering, peeling or loosening of the skin, including inside the mouth  -seizures  Side effects that usually do not require medical attention (report to your doctor or health care professional if they continue or are bothersome):  -diarrhea  -headache  -nausea, vomiting  -pain  This list may not describe all possible side effects. Call your doctor for medical advice about side effects. You may report side effects to FDA at 6-566-FDA-6633.  Where should I keep my medicine?  Keep out of the reach of children.  Store at room temperature between 15 and 30 degrees C (59 and  86 degrees F). Throw away any unused medicine after the expiration date.  NOTE: This sheet is a summary. It may not cover all possible information. If you have questions about this medicine, talk to your doctor, pharmacist, or health care provider.  © 2018 Elsevier/Gold Standard (2016-06-22 10:50:39)  Influenza, Adult  Influenza, more commonly known as “the flu,” is a viral infection that primarily affects the respiratory tract. The respiratory tract includes organs that help you breathe, such as the lungs, nose, and throat. The flu causes many common cold symptoms, as well as a high fever and body aches.  The flu spreads easily from person to person (is contagious). Getting a flu shot (influenza vaccination) every year is the best way to prevent influenza.  What are the causes?  Influenza is caused by a virus. You can catch the virus by:  · Breathing in droplets from an infected person's cough or sneeze.  · Touching something that was recently contaminated with the virus and then touching your mouth, nose, or eyes.  What increases the risk?  The following factors may make you more likely to get the flu:  · Not cleaning your hands frequently with soap and water or alcohol-based hand .  · Having close contact with many people during cold and flu season.  · Touching your mouth, eyes, or nose without washing or sanitizing your hands first.  · Not drinking enough fluids or not eating a healthy diet.  · Not getting enough sleep or exercise.  · Being under a high amount of stress.  · Not getting a yearly (annual) flu shot.  You may be at a higher risk of complications from the flu, such as a severe lung infection (pneumonia), if you:  · Are over the age of 65.  · Are pregnant.  · Have a weakened disease-fighting system (immune system). You may have a weakened immune system if you:  ¨ Have HIV or AIDS.  ¨ Are undergoing chemotherapy.  ¨ Are taking medicines that reduce the activity of (suppress) the immune  system.  · Have a long-term (chronic) illness, such as heart disease, kidney disease, diabetes, or lung disease.  · Have a liver disorder.  · Are obese.  · Have anemia.  What are the signs or symptoms?  Symptoms of this condition typically last 4-10 days and may include:  · Fever.  · Chills.  · Headache, body aches, or muscle aches.  · Sore throat.  · Cough.  · Runny or congested nose.  · Chest discomfort and cough.  · Poor appetite.  · Weakness or tiredness (fatigue).  · Dizziness.  · Nausea or vomiting.  How is this diagnosed?  This condition may be diagnosed based on your medical history and a physical exam. Your health care provider may do a nose or throat swab test to confirm the diagnosis.  How is this treated?  If influenza is detected early, you can be treated with antiviral medicine that can reduce the length of your illness and the severity of your symptoms. This medicine may be given by mouth (orally) or through an IV tube that is inserted in one of your veins.  The goal of treatment is to relieve symptoms by taking care of yourself at home. This may include taking over-the-counter medicines, drinking plenty of fluids, and adding humidity to the air in your home.  In some cases, influenza goes away on its own. Severe influenza or complications from influenza may be treated in a hospital.  Follow these instructions at home:  · Take over-the-counter and prescription medicines only as told by your health care provider.  · Use a cool mist humidifier to add humidity to the air in your home. This can make breathing easier.  · Rest as needed.  · Drink enough fluid to keep your urine clear or pale yellow.  · Cover your mouth and nose when you cough or sneeze.  · Wash your hands with soap and water often, especially after you cough or sneeze. If soap and water are not available, use hand .  · Stay home from work or school as told by your health care provider. Unless you are visiting your health care  provider, try to avoid leaving home until your fever has been gone for 24 hours without the use of medicine.  · Keep all follow-up visits as told by your health care provider. This is important.  How is this prevented?  · Getting an annual flu shot is the best way to avoid getting the flu. You may get the flu shot in late summer, fall, or winter. Ask your health care provider when you should get your flu shot.  · Wash your hands often or use hand  often.  · Avoid contact with people who are sick during cold and flu season.  · Eat a healthy diet, drink plenty of fluids, get enough sleep, and exercise regularly.  Contact a health care provider if:  · You develop new symptoms.  · You have:  ¨ Chest pain.  ¨ Diarrhea.  ¨ A fever.  · Your cough gets worse.  · You produce more mucus.  · You feel nauseous or you vomit.  Get help right away if:  · You develop shortness of breath or difficulty breathing.  · Your skin or nails turn a bluish color.  · You have severe pain or stiffness in your neck.  · You develop a sudden headache or sudden pain in your face or ear.  · You cannot stop vomiting.  This information is not intended to replace advice given to you by your health care provider. Make sure you discuss any questions you have with your health care provider.  Document Released: 12/15/2001 Document Revised: 05/25/2017 Document Reviewed: 10/11/2016  [employer] Interactive Patient Education © 2017 [employer]

## 2018-03-06 NOTE — ASSESSMENT & PLAN NOTE
Illness: 4 days of illness including: nasal congestion, fever greater than 101, cough, myalgias ,  Sinus pain and pressure: bilateral frontal  Symptoms negative for sore throat,   Treatments tried: none   Since onset, symptoms are worse   Similarly ill exposures: yes  Medical history negative for asthma  He  reports that he quit smoking about 37 years ago. His smoking use included Cigarettes. He has a 20.00 pack-year smoking history. He quit smokeless tobacco use about 37 years ago.

## 2018-03-06 NOTE — PROGRESS NOTES
Subjective:     Chief Complaint   Patient presents with   • Cough     productive    • Fever   • Generalized Body Aches     since friday       Joaquin Escoto is a 62 y.o. male here today for evaluation and management of:    Viral upper respiratory tract infection  Illness: 4 days of illness including: nasal congestion, fever greater than 101, cough, myalgias ,  Sinus pain and pressure: bilateral frontal  Symptoms negative for sore throat,   Treatments tried: none   Since onset, symptoms are worse   Similarly ill exposures: yes  Medical history negative for asthma  He  reports that he quit smoking about 37 years ago. His smoking use included Cigarettes. He has a 20.00 pack-year smoking history. He quit smokeless tobacco use about 37 years ago.         Allergies   Allergen Reactions   • Cashew Nut Oil    • Pcn [Penicillins] Anaphylaxis and Swelling       Current medicines (including changes today)  Current Outpatient Prescriptions   Medication Sig Dispense Refill   • oseltamivir (TAMIFLU) 75 MG Cap Take 1 Cap by mouth 2 times a day. 10 Cap 0   • guaifenesin-codeine (TUSSI-ORGANIDIN NR) 100-10 MG/5ML syrup Take 5 mL by mouth 3 times a day as needed for Cough for up to 10 days. 160 mL 0   • omeprazole (PRILOSEC) 20 MG delayed-release capsule Take 1 Cap by mouth every day. 90 Cap 3   • lisinopril (PRINIVIL) 10 MG Tab Take 1 Tab by mouth every day. 30 Tab 6   • fluticasone-salmeterol (ADVAIR DISKUS) 100-50 MCG/DOSE AEROSOL POWDER, BREATH ACTIVATED Inhale 1 Puff by mouth 2 times a day. 60 Inhaler 6   • albuterol (VENTOLIN OR PROVENTIL) 108 (90 BASE) MCG/ACT Aero Soln inhalation aerosol Inhale 2 Puffs by mouth every 6 hours as needed for Shortness of Breath. 8.5 g 3   • aspirin (ASA) 81 MG CHEW Take 1 Tab by mouth every day. 100 Tab 11     No current facility-administered medications for this visit.        He  has a past medical history of Allergy, unspecified not elsewhere classified; Back pain; Elevated blood pressure  "reading without diagnosis of hypertension; Family history of bladder cancer; Family history of hypertension; Family history of ischemic heart disease (IHD); Hearing loss; History of skin cancer; Penicillin allergy; Sleep apnea; Tick bite of abdomen (April 2014); Tick bite of abdomen (April 2014); and Unspecified asthma(493.90).    Patient Active Problem List    Diagnosis Date Noted   • Viral upper respiratory tract infection 03/06/2018   • Hypertension 02/13/2018   • Dyspnea 12/20/2017   • Impaired fasting glucose 10/23/2017   • Polycythemia 04/26/2016   • Obesity 12/15/2015   • Sleep apnea    • Dyslipidemia 11/03/2012   • Mild intermittent asthma 10/23/2012   • Allergic rhinitis 10/23/2012   • S/P arthroscopy of left knee 10/23/2012   • S/P sinus surgery 10/23/2012   • Preventative health care 10/23/2012   • BPH (benign prostatic hypertrophy) 10/23/2012   • GERD (gastroesophageal reflux disease) 10/23/2012       ROS   .  No nausea or vomiting.  No chest pain or palpitations.  No  SOB.  No pain with urination or hematuria.  No black or bloody stools.       Objective:     Blood pressure 124/84, pulse 72, temperature 36.2 °C (97.2 °F), resp. rate 16, height 1.6 m (5' 3\"), weight 98.4 kg (217 lb), SpO2 94 %. Body mass index is 38.44 kg/m².   Physical Exam:  Well developed, well nourished.  Alert, oriented in no acute distress.  Eye contact is good, speech goal directed, affect calm  Eyes: conjunctiva non-injected, sclera non-icteric.  Ears: Pinna normal. TM pearly gray.   Nose: Nares are patent. Erythematous, swollen mucosa with yellow discharge  Mouth: Oral mucous membranes pink and moist with no lesions. Moderate diffuse erythema of the posterior pharynx  Neck Supple.  No adenopathy or masses in the neck or supraclavicular regions. No thyromegaly  Lungs: clear to auscultation bilaterally with good excursion. No wheezes or rhonchi  CV: regular rate and rhythm. No murmur            Assessment and Plan:   The following " treatment plan was discussed    1. Influenza B  Given his history of asthma we'll go ahead and start him on Tamiflu 75 mg twice a day. Cough  syrup as needed. Increase fluids and rest. Call if not improving  - POCT Influenza A/B  - oseltamivir (TAMIFLU) 75 MG Cap; Take 1 Cap by mouth 2 times a day.  Dispense: 10 Cap; Refill: 0  - guaifenesin-codeine (TUSSI-ORGANIDIN NR) 100-10 MG/5ML syrup; Take 5 mL by mouth 3 times a day as needed for Cough for up to 10 days.  Dispense: 160 mL; Refill: 0    Any change or worsening of signs or symptoms, patient encouraged to follow-up or report to the emergency room for further evaluation. Patient understands and agrees.    Followup: Return if symptoms worsen or fail to improve.

## 2018-04-09 ENCOUNTER — TELEPHONE (OUTPATIENT)
Dept: MEDICAL GROUP | Facility: MEDICAL CENTER | Age: 63
End: 2018-04-09

## 2018-04-09 DIAGNOSIS — R73.01 IMPAIRED FASTING GLUCOSE: ICD-10-CM

## 2018-04-09 DIAGNOSIS — E78.5 DYSLIPIDEMIA: Chronic | ICD-10-CM

## 2018-04-09 DIAGNOSIS — I10 HYPERTENSION, UNSPECIFIED TYPE: ICD-10-CM

## 2018-04-09 LAB
ALBUMIN SERPL-MCNC: 4.2 G/DL (ref 3.6–4.8)
ALBUMIN/GLOB SERPL: 1.6 {RATIO} (ref 1.2–2.2)
ALP SERPL-CCNC: 75 IU/L (ref 39–117)
ALT SERPL-CCNC: 32 IU/L (ref 0–44)
APPEARANCE UR: CLEAR
AST SERPL-CCNC: 25 IU/L (ref 0–40)
BACTERIA #/AREA URNS HPF: NORMAL /[HPF]
BASOPHILS # BLD AUTO: 0 X10E3/UL (ref 0–0.2)
BASOPHILS NFR BLD AUTO: 1 %
BILIRUB SERPL-MCNC: 0.5 MG/DL (ref 0–1.2)
BILIRUB UR QL STRIP: NEGATIVE
BUN SERPL-MCNC: 25 MG/DL (ref 8–27)
BUN/CREAT SERPL: 20 (ref 10–24)
CALCIUM SERPL-MCNC: 9.4 MG/DL (ref 8.6–10.2)
CASTS URNS MICRO: NORMAL
CASTS URNS QL MICRO: NORMAL
CHLORIDE SERPL-SCNC: 104 MMOL/L (ref 96–106)
CHOLEST SERPL-MCNC: 229 MG/DL (ref 100–199)
CO2 SERPL-SCNC: 23 MMOL/L (ref 18–29)
COLOR UR: YELLOW
CREAT SERPL-MCNC: 1.27 MG/DL (ref 0.76–1.27)
CRYSTALS URNS MICRO: NORMAL
EOSINOPHIL # BLD AUTO: 0.2 X10E3/UL (ref 0–0.4)
EOSINOPHIL NFR BLD AUTO: 2 %
EPI CELLS #/AREA URNS HPF: NORMAL /HPF
EPO SERPL-ACNC: 10.4 MIU/ML (ref 2.6–18.5)
ERYTHROCYTE [DISTWIDTH] IN BLOOD BY AUTOMATED COUNT: 13.7 % (ref 12.3–15.4)
GFR SERPLBLD CREATININE-BSD FMLA CKD-EPI: 60 ML/MIN/1.73
GFR SERPLBLD CREATININE-BSD FMLA CKD-EPI: 69 ML/MIN/1.73
GLOBULIN SER CALC-MCNC: 2.6 G/DL (ref 1.5–4.5)
GLUCOSE SERPL-MCNC: 114 MG/DL (ref 65–99)
GLUCOSE UR QL: NEGATIVE
HBA1C MFR BLD: 6.2 % (ref 4.8–5.6)
HCT VFR BLD AUTO: 51.3 % (ref 37.5–51)
HDLC SERPL-MCNC: 44 MG/DL
HGB BLD-MCNC: 17.7 G/DL (ref 13–17.7)
HGB UR QL STRIP: NEGATIVE
IMM GRANULOCYTES # BLD: 0 X10E3/UL (ref 0–0.1)
IMM GRANULOCYTES NFR BLD: 0 %
IMMATURE CELLS  115398: ABNORMAL
KETONES UR QL STRIP: NEGATIVE
LABORATORY COMMENT REPORT: ABNORMAL
LDLC SERPL CALC-MCNC: 166 MG/DL (ref 0–99)
LEUKOCYTE ESTERASE UR QL STRIP: NEGATIVE
LYMPHOCYTES # BLD AUTO: 2.5 X10E3/UL (ref 0.7–3.1)
LYMPHOCYTES NFR BLD AUTO: 38 %
MCH RBC QN AUTO: 31.1 PG (ref 26.6–33)
MCHC RBC AUTO-ENTMCNC: 34.5 G/DL (ref 31.5–35.7)
MCV RBC AUTO: 90 FL (ref 79–97)
MICRO URNS: NORMAL
MICRO URNS: NORMAL
MONOCYTES # BLD AUTO: 0.4 X10E3/UL (ref 0.1–0.9)
MONOCYTES NFR BLD AUTO: 6 %
MORPHOLOGY BLD-IMP: ABNORMAL
MUCOUS THREADS URNS QL MICRO: PRESENT
NEUTROPHILS # BLD AUTO: 3.5 X10E3/UL (ref 1.4–7)
NEUTROPHILS NFR BLD AUTO: 53 %
NITRITE UR QL STRIP: NEGATIVE
NRBC BLD AUTO-RTO: ABNORMAL %
PH UR STRIP: 6 [PH] (ref 5–7.5)
PLATELET # BLD AUTO: 156 X10E3/UL (ref 150–379)
POTASSIUM SERPL-SCNC: 4.5 MMOL/L (ref 3.5–5.2)
PROT SERPL-MCNC: 6.8 G/DL (ref 6–8.5)
PROT UR QL STRIP: NEGATIVE
RBC # BLD AUTO: 5.7 X10E6/UL (ref 4.14–5.8)
RBC #/AREA URNS HPF: NORMAL /HPF
RENAL EPI CELLS #/AREA URNS HPF: NORMAL /[HPF]
SODIUM SERPL-SCNC: 139 MMOL/L (ref 134–144)
SP GR UR: 1.02 (ref 1–1.03)
T VAGINALIS URNS QL MICRO: NORMAL
TRIGL SERPL-MCNC: 93 MG/DL (ref 0–149)
UNIDENT CRYS URNS QL MICRO: NORMAL
URINALYSIS REFLEX  377202: NORMAL
URNS CMNT MICRO: NORMAL
UROBILINOGEN UR STRIP-MCNC: 0.2 MG/DL (ref 0.2–1)
VIT B12 SERPL-MCNC: 294 PG/ML (ref 232–1245)
VLDLC SERPL CALC-MCNC: 19 MG/DL (ref 5–40)
WBC # BLD AUTO: 6.7 X10E3/UL (ref 3.4–10.8)
WBC #/AREA URNS HPF: NORMAL /HPF
YEAST #/AREA URNS HPF: NORMAL /[HPF]

## 2018-04-09 RX ORDER — ATORVASTATIN CALCIUM 20 MG/1
20 TABLET, FILM COATED ORAL DAILY
Qty: 30 TAB | Refills: 5 | Status: SHIPPED | OUTPATIENT
Start: 2018-04-09 | End: 2019-05-06 | Stop reason: SDUPTHER

## 2018-04-09 RX ORDER — LISINOPRIL 10 MG/1
10 TABLET ORAL 2 TIMES DAILY
Qty: 60 TAB | Refills: 5 | Status: SHIPPED | OUTPATIENT
Start: 2018-04-09 | End: 2018-05-01

## 2018-04-10 NOTE — TELEPHONE ENCOUNTER
Notified with labs  Recommend start statin Lipitor 20 mg, can cause muscle aches and muscle pain, repeat labs 4 weeks[facility]  Work on diet, exercise, weight loss, prediabetes, eliminate sweets, candies, processed foods  Blood pressure running 90s systolic on lisinopril 10 mg in the morning, increased lisinopril 10 mg twice a day, prescription to pharmacy, send us update 2 weeks

## 2018-04-28 DIAGNOSIS — J45.20 MILD INTERMITTENT ASTHMA, UNCOMPLICATED: ICD-10-CM

## 2018-05-01 ENCOUNTER — TELEPHONE (OUTPATIENT)
Dept: MEDICAL GROUP | Facility: MEDICAL CENTER | Age: 63
End: 2018-05-01

## 2018-05-01 DIAGNOSIS — I10 ESSENTIAL HYPERTENSION: ICD-10-CM

## 2018-05-01 PROBLEM — J06.9 VIRAL UPPER RESPIRATORY TRACT INFECTION: Status: RESOLVED | Noted: 2018-03-06 | Resolved: 2018-05-01

## 2018-05-01 RX ORDER — LOSARTAN POTASSIUM 50 MG/1
50 TABLET ORAL DAILY
Qty: 30 TAB | Refills: 5 | Status: SHIPPED | OUTPATIENT
Start: 2018-05-01 | End: 2019-05-05 | Stop reason: SDUPTHER

## 2018-05-01 NOTE — TELEPHONE ENCOUNTER
Please call patient, we received a note from his pharmacy indicating that the patient may be having a cough related to the lisinopril, so we could consider changing his blood pressure medication, please confirm that the patient is having a cough with the lisinopril.

## 2018-05-01 NOTE — TELEPHONE ENCOUNTER
Er wife (Yolanda), pt does have a cough and it is quite annoying. Would appreciate the change in medication. Wife is going to pharmacy today, she can . Dora Wallace.

## 2018-05-01 NOTE — TELEPHONE ENCOUNTER
Please notify patient that we will send a prescription for losartan, a new blood pressure medication to his pharmacy, he can stop the lisinopril.    Have him check his blood pressure regularly on the new medication and give us an update in 2 weeks.

## 2018-05-09 NOTE — TELEPHONE ENCOUNTER
Pt LM for us to LM with wife as he is hard to contact during business hours.    Spoke with Yolanda. She will relay your instructions for new med and BP monitoring.

## 2018-09-28 ENCOUNTER — TELEPHONE (OUTPATIENT)
Dept: MEDICAL GROUP | Facility: MEDICAL CENTER | Age: 63
End: 2018-09-28

## 2018-09-28 DIAGNOSIS — Z91.030 BEE STING ALLERGY: ICD-10-CM

## 2018-09-28 RX ORDER — EPINEPHRINE 0.3 MG/.3ML
0.3 INJECTION SUBCUTANEOUS ONCE
Qty: 0.3 ML | Refills: 1 | Status: SHIPPED | OUTPATIENT
Start: 2018-09-28 | End: 2020-12-01 | Stop reason: SDUPTHER

## 2018-09-28 NOTE — TELEPHONE ENCOUNTER
Please notify patient I can prescribe a prescription for an EpiPen.  I need to know which pharmacy he would like this sent to.

## 2018-09-28 NOTE — TELEPHONE ENCOUNTER
1. Caller Name: Yolanda                      Call Back Number: [phone] (home) [phone] (work)    2. Message: Pt needs Rx for Epi Pen due to the bee population in his yard and his past reaction to being stung. Did not locate any past Rx for this in chart. Pls advise.     3. Patient approves office to leave a detailed voicemail/MyChart message: yes

## 2019-01-21 ENCOUNTER — OFFICE VISIT (OUTPATIENT)
Dept: MEDICAL GROUP | Facility: MEDICAL CENTER | Age: 64
End: 2019-01-21
Payer: COMMERCIAL

## 2019-01-21 ENCOUNTER — TELEPHONE (OUTPATIENT)
Dept: MEDICAL GROUP | Facility: MEDICAL CENTER | Age: 64
End: 2019-01-21

## 2019-01-21 VITALS
WEIGHT: 223 LBS | OXYGEN SATURATION: 94 % | DIASTOLIC BLOOD PRESSURE: 88 MMHG | TEMPERATURE: 97.6 F | HEART RATE: 74 BPM | BODY MASS INDEX: 39.51 KG/M2 | SYSTOLIC BLOOD PRESSURE: 128 MMHG | HEIGHT: 63 IN

## 2019-01-21 DIAGNOSIS — R73.01 IMPAIRED FASTING GLUCOSE: ICD-10-CM

## 2019-01-21 DIAGNOSIS — E66.9 CLASS 2 OBESITY WITHOUT SERIOUS COMORBIDITY WITH BODY MASS INDEX (BMI) OF 38.0 TO 38.9 IN ADULT, UNSPECIFIED OBESITY TYPE: ICD-10-CM

## 2019-01-21 DIAGNOSIS — I10 ESSENTIAL HYPERTENSION: ICD-10-CM

## 2019-01-21 DIAGNOSIS — Z12.11 COLON CANCER SCREENING: ICD-10-CM

## 2019-01-21 DIAGNOSIS — Z23 NEED FOR VACCINATION: ICD-10-CM

## 2019-01-21 DIAGNOSIS — E78.5 DYSLIPIDEMIA: Chronic | ICD-10-CM

## 2019-01-21 PROCEDURE — 99213 OFFICE O/P EST LOW 20 MIN: CPT | Mod: 25 | Performed by: INTERNAL MEDICINE

## 2019-01-21 PROCEDURE — 90686 IIV4 VACC NO PRSV 0.5 ML IM: CPT | Performed by: INTERNAL MEDICINE

## 2019-01-21 PROCEDURE — 90471 IMMUNIZATION ADMIN: CPT | Performed by: INTERNAL MEDICINE

## 2019-01-21 ASSESSMENT — PATIENT HEALTH QUESTIONNAIRE - PHQ9: CLINICAL INTERPRETATION OF PHQ2 SCORE: 0

## 2019-01-21 NOTE — PROGRESS NOTES
Subjective:      Joaquin Escoto is a 63 y.o. male who presents with Dizziness             HPI   Last three weeks when lying down will notice room spinning sensation, no vision changes, no headache, no falls, no recent URI or air travel, no difficulty with speech or swallow, no sensory changes, no bowel or bladder issues, no weakness arms or legs, no new meds or supplements, no head trauma, no vision changes, blurry vision, double vision.  No ear pain, no tinnitus, no hearing loss.  No lightheadedness or dizziness during the day when sitting or standing, just when lying down, seems to be improving  Blood pressure at home running 134/94 on cozaar 50 mg with no lightheadedness or syncope  No regular exercise   Sees [facility] sleep apnea uses cpap   Dyslipidemia on Lipitor no muscle pain or muscle aches  Asthma on Advair once a day, no breakthrough symptoms, no shortness of breath, cough, wheezing, chest tightness  No tobacco, rare alcohol        Current Outpatient Prescriptions   Medication Sig Dispense Refill   • losartan (COZAAR) 50 MG Tab Take 1 Tab by mouth every day. 30 Tab 5   • fluticasone-salmeterol (ADVAIR DISKUS) 100-50 MCG/DOSE AEROSOL POWDER, BREATH ACTIVATED Inhale 1 Puff by mouth 2 times a day. 60 Inhaler 6   • atorvastatin (LIPITOR) 20 MG Tab Take 1 Tab by mouth every day. 30 Tab 5   • omeprazole (PRILOSEC) 20 MG delayed-release capsule Take 1 Cap by mouth every day. 90 Cap 3   • albuterol (VENTOLIN OR PROVENTIL) 108 (90 BASE) MCG/ACT Aero Soln inhalation aerosol Inhale 2 Puffs by mouth every 6 hours as needed for Shortness of Breath. 8.5 g 3   • aspirin (ASA) 81 MG CHEW Take 1 Tab by mouth every day. (Patient not taking: Reported on 1/21/2019) 100 Tab 11     No current facility-administered medications for this visit.         Allergic rhinitis  2007 did see  for allergy testing     BPH  Saw dr.garey aldrich urology had microwave procedure   3/8/13 psa 2.5 per urology note  5/1/14 psa 1.8  6/11/15  urology [place] note, s/p TUMT several years ago, psa stable, digital rectal exam normal, followup one year  4/26/16 psa 2.7  10/23/17 psa 2.1  2/13/18 sees dr.garey aldrich     dyspnea  10/24/17 apnea link, UA repeat consider renal ultrasound if RBC persists, patient does complain of occasional shortness of breath, chest x-ray, echo, treadmill stress test ordered  11/14/17 exercise treadmill stress test done at [facility] good exercise tolerance, no ST-T wave abnormalities, hypertensive response at baseline and with exercise, no chest pain, clive protocol 12 minutes  11/14/17 echo done at [facility] normal left ventricle size and function, EF 50-55%, impaired diastolic filling, no significant valvular disease     Dyslipidemia  6/09 exercise thallium at [facility], no ischemia, EF 63%  4/11 chol 220,trig 204,hdl 41,ldl 138  5/1/14 chol 191,trig 76,hdl 46,ldl 130,LDL-P 1303,HDL-P 27.9,LP-IR 49; CRP 1.2, Lp(a) 67, urine mac < 0.5  5/16/14 [facility]; treadmill stress test 85% maximum predicted heart rate, no ST depression, solitary PVC, no arrhythmia, negative stress test  5/16/14 CT cardiac scoring; LMA-0, LCX-1.7, LAD 10.8, RCA-6.3, PDA-0; total calcium score 18.8  5/16/15 carotid ultrasound <50% stenosis  5/16/14 DES negative  5/16/14 ultrasound aorta negative  4/26/16 chol 219,trig 134,hdl 42,ldl 150  10/23/17 chol 219,trig 160,hdl 41,ldl 146  4/9/18 chol 229,trig 93,hdl 44,ldl 166 start lipitor 20 mg     Gastroesophageal reflux  On prilosec daily  3/10 EGD per [facility]     hypertension  11/14/17 exercise treadmill stress test done at [facility] good exercise tolerance, no ST-T wave abnormalities, hypertensive response at baseline and with exercise, no chest pain, clive protocol 12 minutes  11/14/17 echo done at [facility] normal left ventricle size and function, EF 50-55%, impaired diastolic filling, no significant valvular disease  2/13/18 start lisinopril 10 mg  4/9/18 increase lisinopril to 10 mg  bid  5/1/18 stop lisinopril cough, change to losartan 50 mg  Lisinopril cough     Impaired fasting blood sugar  10/23/17 A1c 6.%,bs 113  4/9/18 A1c 6.2%     Mild intermittent asthma  2008 on advair  10/24/17 on advair 250/50 bid      Polycythemia  5/1/14 hgb 18,hct 53.2  4/26/16 hgb 18,hct 51.7  10/23/17 hgb 18,hct 51.7  11/14/17 echo done at [facility] normal left ventricle size and function, EF 50-55%, impaired diastolic filling, no significant valvular disease  4/9/18 hgb 17.7,hct 51,EPO 10,b12 294     Preventative health  11/24/08 colon [facility] polyp hyperplastic, repeat 10 years  2010 pneumovax no webiz  2/7/12 tdap at [facility]   8/9/12 hep a and b series completed  7/15/14 zoster vaccine  10/23/17 psa 2.1  10/23/17 vit d 26 start 2000 units  11/9/17 FIT negative     Left knee arthroscopy 2010     Sleep apnea  had ENT surgery/uvulectomy  7/21/14 apnea link AHI 5,RI 7,hypopnea index 5, evaluation over 5 hours 44 min  8/7/14 patient declines sleep study     s/p sinus surgery                Patient Active Problem List   Diagnosis   • Mild intermittent asthma   • Allergic rhinitis   • S/P arthroscopy of left knee   • S/P sinus surgery   • Preventative health care   • BPH (benign prostatic hypertrophy)   • GERD (gastroesophageal reflux disease)   • Dyslipidemia   • Sleep apnea   • Obesity   • Polycythemia   • Impaired fasting glucose   • Dyspnea   • Hypertension   • Bee sting allergy     Depression Screening    Little interest or pleasure in doing things?  0 - not at all  Feeling down, depressed , or hopeless? 0 - not at all  Patient Health Questionnaire Score: 0          Health Maintenance Summary                IMM DTaP/Tdap/Td Vaccine Overdue 8/14/1973     IMM PNEUMOCOCCAL 19-64 (ADULT) MEDIUM RISK SERIES Overdue 8/14/1973     IMM ZOSTER VACCINES Overdue 9/9/2014      Done 7/15/2014 Imm Admin: Zoster Vaccine Live (ZVL) (Zostavax)    IMM INFLUENZA Overdue 9/1/2018      Done 12/15/2015 Imm Admin: Influenza Vaccine Quad  "Inj (Pf)     Patient has more history with this topic...    COLONOSCOPY Next Due 5/16/2019      Done 5/16/2009           Patient Care Team:  Oj Estrada M.D. as PCP - General    ROS       Objective:     /88 (BP Location: Left arm, Patient Position: Sitting, BP Cuff Size: Adult)   Pulse 74   Temp 36.4 °C (97.6 °F)   Ht 1.6 m (5' 3\")   Wt 101.2 kg (223 lb)   SpO2 94%   BMI 39.50 kg/m²      Physical Exam   Constitutional: He appears well-developed and well-nourished. No distress.   HENT:   Head: Normocephalic and atraumatic.   Right Ear: External ear normal.   Left Ear: External ear normal.   Nose: Nose normal.   Mouth/Throat: Oropharynx is clear and moist. No oropharyngeal exudate.   Eyes: Pupils are equal, round, and reactive to light. Conjunctivae and EOM are normal. Right eye exhibits no discharge. Left eye exhibits no discharge. No scleral icterus.   Neck: Neck supple. No JVD present. No thyromegaly present.   Cardiovascular: Normal rate and regular rhythm.    Pulmonary/Chest: Effort normal and breath sounds normal. No respiratory distress. He has no wheezes.   Abdominal: He exhibits no distension.   Neurological: He is alert. He displays normal reflexes. No cranial nerve deficit or sensory deficit. He exhibits normal muscle tone. Coordination normal.   Skin: Skin is warm. He is not diaphoretic.   Psychiatric: He has a normal mood and affect. His behavior is normal. Thought content normal.   Nursing note and vitals reviewed.   No nystagmus, normal strength upper and lower extremities, negative Romberg                       Assessment/Plan:   Assessment  #!  Dizziness, benign positional vertigo typically when lying down, no alarm signs or symptoms by history or on exam, no evidence of otitis, sinusitis, no evidence of TIA, stroke, neurologic deficits    #2 hypertension slightly running higher at home on losartan 50 mg, not exercising    #3 dyslipidemia on Lipitor no muscle pain or muscle " aches    #4 sleep apnea followed by [facility] no records    #5 BMI 39.5    #6 impaired glucose metabolism        Plan  #! Old records [facility] sleep group     #2 [facility] referral     #3 labs     #4 flu vaccine    #5 labs    #6 check blood pressure regularly and record, continue losartan, start exercising, he will work out with a     #7 continue CPAP    #8 follow-up 6 months    #9 declines vestibular therapy, monitor for worsening symptoms, symptoms should improve, if worsens to call us

## 2019-02-20 ENCOUNTER — TELEPHONE (OUTPATIENT)
Dept: MEDICAL GROUP | Facility: MEDICAL CENTER | Age: 64
End: 2019-02-20

## 2019-02-20 PROBLEM — R94.4 DECREASED GFR: Status: ACTIVE | Noted: 2019-02-20

## 2019-02-20 LAB
ALBUMIN SERPL-MCNC: 3.9 G/DL (ref 3.6–4.8)
ALBUMIN/GLOB SERPL: 1.6 {RATIO} (ref 1.2–2.2)
ALP SERPL-CCNC: 79 IU/L (ref 39–117)
ALT SERPL-CCNC: 32 IU/L (ref 0–44)
APPEARANCE UR: CLEAR
AST SERPL-CCNC: 24 IU/L (ref 0–40)
BACTERIA #/AREA URNS HPF: NORMAL /[HPF]
BASOPHILS # BLD AUTO: 0 X10E3/UL (ref 0–0.2)
BASOPHILS NFR BLD AUTO: 0 %
BILIRUB SERPL-MCNC: 0.5 MG/DL (ref 0–1.2)
BILIRUB UR QL STRIP: NEGATIVE
BUN SERPL-MCNC: 24 MG/DL (ref 8–27)
BUN/CREAT SERPL: 18 (ref 10–24)
CALCIUM SERPL-MCNC: 9 MG/DL (ref 8.6–10.2)
CASTS URNS MICRO: NORMAL
CASTS URNS QL MICRO: NORMAL
CHLORIDE SERPL-SCNC: 104 MMOL/L (ref 96–106)
CHOLEST SERPL-MCNC: 155 MG/DL (ref 100–199)
CO2 SERPL-SCNC: 24 MMOL/L (ref 20–29)
COLOR UR: YELLOW
CREAT SERPL-MCNC: 1.32 MG/DL (ref 0.76–1.27)
CRYSTALS URNS MICRO: NORMAL
EOSINOPHIL # BLD AUTO: 0.3 X10E3/UL (ref 0–0.4)
EOSINOPHIL NFR BLD AUTO: 3 %
EPI CELLS #/AREA URNS HPF: NORMAL /HPF
ERYTHROCYTE [DISTWIDTH] IN BLOOD BY AUTOMATED COUNT: 13.5 % (ref 12.3–15.4)
GLOBULIN SER CALC-MCNC: 2.5 G/DL (ref 1.5–4.5)
GLUCOSE SERPL-MCNC: 109 MG/DL (ref 65–99)
GLUCOSE UR QL: NEGATIVE
HBA1C MFR BLD: 6.3 % (ref 4.8–5.6)
HCT VFR BLD AUTO: 53.1 % (ref 37.5–51)
HDLC SERPL-MCNC: 42 MG/DL
HGB BLD-MCNC: 18.2 G/DL (ref 13–17.7)
HGB UR QL STRIP: NEGATIVE
IMM GRANULOCYTES # BLD AUTO: 0 X10E3/UL (ref 0–0.1)
IMM GRANULOCYTES NFR BLD AUTO: 0 %
IMMATURE CELLS  115398: ABNORMAL
KETONES UR QL STRIP: NEGATIVE
LABORATORY COMMENT REPORT: NORMAL
LDLC SERPL CALC-MCNC: 96 MG/DL (ref 0–99)
LEUKOCYTE ESTERASE UR QL STRIP: NEGATIVE
LYMPHOCYTES # BLD AUTO: 2.7 X10E3/UL (ref 0.7–3.1)
LYMPHOCYTES NFR BLD AUTO: 34 %
MCH RBC QN AUTO: 31.7 PG (ref 26.6–33)
MCHC RBC AUTO-ENTMCNC: 34.3 G/DL (ref 31.5–35.7)
MCV RBC AUTO: 93 FL (ref 79–97)
MICRO URNS: NORMAL
MICRO URNS: NORMAL
MONOCYTES # BLD AUTO: 0.5 X10E3/UL (ref 0.1–0.9)
MONOCYTES NFR BLD AUTO: 6 %
MORPHOLOGY BLD-IMP: ABNORMAL
MUCOUS THREADS URNS QL MICRO: PRESENT
NEUTROPHILS # BLD AUTO: 4.3 X10E3/UL (ref 1.4–7)
NEUTROPHILS NFR BLD AUTO: 57 %
NITRITE UR QL STRIP: NEGATIVE
NRBC BLD AUTO-RTO: ABNORMAL %
PH UR STRIP: 5.5 [PH] (ref 5–7.5)
PLATELET # BLD AUTO: 183 X10E3/UL (ref 150–379)
POTASSIUM SERPL-SCNC: 4.5 MMOL/L (ref 3.5–5.2)
PROT SERPL-MCNC: 6.4 G/DL (ref 6–8.5)
PROT UR QL STRIP: NEGATIVE
RBC # BLD AUTO: 5.74 X10E6/UL (ref 4.14–5.8)
RBC #/AREA URNS HPF: NORMAL /HPF
RENAL EPI CELLS #/AREA URNS HPF: NORMAL /[HPF]
SODIUM SERPL-SCNC: 140 MMOL/L (ref 134–144)
SP GR UR: 1.02 (ref 1–1.03)
T VAGINALIS URNS QL MICRO: NORMAL
TRIGL SERPL-MCNC: 84 MG/DL (ref 0–149)
UNIDENT CRYS URNS QL MICRO: NORMAL
URINALYSIS REFLEX  377202: NORMAL
URNS CMNT MICRO: NORMAL
UROBILINOGEN UR STRIP-MCNC: 0.2 MG/DL (ref 0.2–1)
VLDLC SERPL CALC-MCNC: 17 MG/DL (ref 5–40)
WBC # BLD AUTO: 7.8 X10E3/UL (ref 3.4–10.8)
WBC #/AREA URNS HPF: NORMAL /HPF
YEAST #/AREA URNS HPF: NORMAL /[HPF]

## 2019-02-21 NOTE — TELEPHONE ENCOUNTER
Notified with labs, cholesterol improved on statin continue, A1c stable, no medications work on nutrition, diet, exercise, repeat A1c and renal function in July at next appointment.

## 2019-03-08 ENCOUNTER — TELEPHONE (OUTPATIENT)
Dept: MEDICAL GROUP | Facility: MEDICAL CENTER | Age: 64
End: 2019-03-08

## 2019-03-08 NOTE — TELEPHONE ENCOUNTER
He sees [facility] for sleep apnea.  Does his insurance not cover [facility]?  Normally his sleep doctor needs to order the sleep apnea equipment.      I do not have any recent records from [facility] other than from May 2018, and I do not have any of their sleep study test results and I do not have any record of his current CPAP settings.    If [facility] if still covered on his plan, they would need to order his sleep equipment, otherwise we will need up-to-date records on his last sleep study test and his current sleep settings.

## 2019-03-08 NOTE — TELEPHONE ENCOUNTER
Yolanda Barbournessa  [phone] (home) [phone] (work)    Pt has change insurances and needs a new order for his (breathing machine ?).  Left a message to get more details. Needs it to go to [employer]: [phone].    Jason Perez @ [email or web address] it is a CPAP.

## 2019-03-19 NOTE — TELEPHONE ENCOUNTER
Spoke with Yolanda and she will contact [facility]. If they cannot get what they need from the Pulm dept, they will get his records for you. Explained that we would like to have the records also.

## 2019-05-06 DIAGNOSIS — E78.5 DYSLIPIDEMIA: Chronic | ICD-10-CM

## 2019-05-06 RX ORDER — ATORVASTATIN CALCIUM 20 MG/1
20 TABLET, FILM COATED ORAL DAILY
Qty: 30 TAB | Refills: 0 | Status: SHIPPED | OUTPATIENT
Start: 2019-05-06 | End: 2019-05-06 | Stop reason: SDUPTHER

## 2019-05-06 RX ORDER — ATORVASTATIN CALCIUM 20 MG/1
20 TABLET, FILM COATED ORAL DAILY
Qty: 90 TAB | Refills: 3 | Status: SHIPPED | OUTPATIENT
Start: 2019-05-06 | End: 2020-06-04

## 2019-05-06 NOTE — TELEPHONE ENCOUNTER
Lipitor  Pt states that he requested a RF and per the pharmacy, you denied as inappropriate.   Please advise.       Was the patient seen in the last year in this department? Yes    Does patient have an active prescription for medications requested? No     Received Request Via: Patient

## 2020-03-21 ENCOUNTER — TELEPHONE (OUTPATIENT)
Dept: MEDICAL GROUP | Facility: MEDICAL CENTER | Age: 65
End: 2020-03-21

## 2020-03-21 DIAGNOSIS — I10 ESSENTIAL HYPERTENSION: ICD-10-CM

## 2020-03-21 RX ORDER — LOSARTAN POTASSIUM 50 MG/1
50 TABLET ORAL DAILY
Qty: 30 TAB | Refills: 2 | Status: SHIPPED | OUTPATIENT
Start: 2020-03-21 | End: 2020-03-31

## 2020-06-04 ENCOUNTER — TELEPHONE (OUTPATIENT)
Dept: MEDICAL GROUP | Facility: MEDICAL CENTER | Age: 65
End: 2020-06-04

## 2020-06-04 DIAGNOSIS — E78.5 DYSLIPIDEMIA: Chronic | ICD-10-CM

## 2020-06-04 RX ORDER — ATORVASTATIN CALCIUM 20 MG/1
TABLET, FILM COATED ORAL
Qty: 45 TAB | Refills: 0 | Status: SHIPPED | OUTPATIENT
Start: 2020-06-04 | End: 2020-07-21

## 2020-06-17 DIAGNOSIS — K21.9 GASTROESOPHAGEAL REFLUX DISEASE WITHOUT ESOPHAGITIS: ICD-10-CM

## 2020-06-17 RX ORDER — OMEPRAZOLE 20 MG/1
20 CAPSULE, DELAYED RELEASE ORAL
Qty: 30 CAP | Refills: 0 | Status: SHIPPED | OUTPATIENT
Start: 2020-06-17 | End: 2020-08-17 | Stop reason: SDUPTHER

## 2020-08-17 ENCOUNTER — OFFICE VISIT (OUTPATIENT)
Dept: MEDICAL GROUP | Facility: MEDICAL CENTER | Age: 65
End: 2020-08-17

## 2020-08-17 VITALS
HEART RATE: 78 BPM | OXYGEN SATURATION: 97 % | SYSTOLIC BLOOD PRESSURE: 134 MMHG | HEIGHT: 63 IN | WEIGHT: 216 LBS | BODY MASS INDEX: 38.27 KG/M2 | TEMPERATURE: 97.6 F | RESPIRATION RATE: 16 BRPM | DIASTOLIC BLOOD PRESSURE: 74 MMHG

## 2020-08-17 DIAGNOSIS — Z12.5 PROSTATE CANCER SCREENING: ICD-10-CM

## 2020-08-17 DIAGNOSIS — Z11.59 NEED FOR HEPATITIS C SCREENING TEST: ICD-10-CM

## 2020-08-17 DIAGNOSIS — J45.20 MILD INTERMITTENT ASTHMA, UNCOMPLICATED: ICD-10-CM

## 2020-08-17 DIAGNOSIS — E78.5 DYSLIPIDEMIA: Chronic | ICD-10-CM

## 2020-08-17 DIAGNOSIS — J45.909 REACTIVE AIRWAY DISEASE WITHOUT COMPLICATION, UNSPECIFIED ASTHMA SEVERITY, UNSPECIFIED WHETHER PERSISTENT: ICD-10-CM

## 2020-08-17 DIAGNOSIS — E66.9 CLASS 2 OBESITY WITHOUT SERIOUS COMORBIDITY WITH BODY MASS INDEX (BMI) OF 38.0 TO 38.9 IN ADULT, UNSPECIFIED OBESITY TYPE: ICD-10-CM

## 2020-08-17 DIAGNOSIS — J45.20 MILD INTERMITTENT ASTHMA WITHOUT COMPLICATION: ICD-10-CM

## 2020-08-17 DIAGNOSIS — E55.9 VITAMIN D DEFICIENCY: ICD-10-CM

## 2020-08-17 DIAGNOSIS — Z00.00 PREVENTATIVE HEALTH CARE: Chronic | ICD-10-CM

## 2020-08-17 DIAGNOSIS — I10 ESSENTIAL HYPERTENSION: ICD-10-CM

## 2020-08-17 DIAGNOSIS — R73.09 IMPAIRED GLUCOSE METABOLISM: ICD-10-CM

## 2020-08-17 DIAGNOSIS — Z20.822 EXPOSURE TO COVID-19 VIRUS: ICD-10-CM

## 2020-08-17 DIAGNOSIS — K21.9 GASTROESOPHAGEAL REFLUX DISEASE WITHOUT ESOPHAGITIS: ICD-10-CM

## 2020-08-17 DIAGNOSIS — G47.30 SLEEP APNEA, UNSPECIFIED TYPE: ICD-10-CM

## 2020-08-17 PROCEDURE — 99213 OFFICE O/P EST LOW 20 MIN: CPT | Performed by: INTERNAL MEDICINE

## 2020-08-17 RX ORDER — LOSARTAN POTASSIUM 50 MG/1
50 TABLET ORAL
Qty: 90 TAB | Refills: 1 | Status: SHIPPED | OUTPATIENT
Start: 2020-08-17 | End: 2021-03-23

## 2020-08-17 RX ORDER — ATORVASTATIN CALCIUM 20 MG/1
20 TABLET, FILM COATED ORAL
Qty: 90 TAB | Refills: 1 | Status: SHIPPED | OUTPATIENT
Start: 2020-08-17 | End: 2020-08-18

## 2020-08-17 RX ORDER — OMEPRAZOLE 20 MG/1
20 CAPSULE, DELAYED RELEASE ORAL
Qty: 90 CAP | Refills: 1 | Status: SHIPPED | OUTPATIENT
Start: 2020-08-17 | End: 2020-09-22

## 2020-08-17 RX ORDER — ALBUTEROL SULFATE 90 UG/1
2 AEROSOL, METERED RESPIRATORY (INHALATION) EVERY 6 HOURS PRN
Qty: 8.5 G | Refills: 3 | Status: SHIPPED | OUTPATIENT
Start: 2020-08-17

## 2020-08-17 ASSESSMENT — FIBROSIS 4 INDEX: FIB4 SCORE: 1.53

## 2020-08-17 NOTE — PROGRESS NOTES
Subjective:      Joaquin Escoto is a 65 y.o. male who presents with htn        HPI     Here for follow up htn on losartan 50 mg blood pressure at home running 120s/70s, also on lipitor for cholesterol, no recent labs.  His insurance is out of network.  [facility] is his preferred.  Patient not using CPAP, has seen [facility] and feels fine using not using CPAP no increasing snoring per his wife.  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  No frequent upper respiratory tract infections, no chest pain, no shortness of breath, cough, or wheezing  No muscle aches or muscle pains with Lipitor  BPH followed by urology off Flomax    Current Outpatient Medications   Medication Sig Dispense Refill   • atorvastatin (LIPITOR) 20 MG Tab Take 1 Tab by mouth every day. 15 Tab 0   • omeprazole (PRILOSEC) 20 MG delayed-release capsule Take 1 Cap by mouth every day. 30 Cap 0   • losartan (COZAAR) 50 MG Tab TAKE ONE TABLET BY MOUTH EVERY DAY 90 Tab 0   • fluticasone-salmeterol (ADVAIR) 100-50 MCG/DOSE AEROSOL POWDER, BREATH ACTIVATED Inhale 1 Puff by mouth 2 times a day. 60 Inhaler 6   • albuterol (VENTOLIN OR PROVENTIL) 108 (90 BASE) MCG/ACT Aero Soln inhalation aerosol Inhale 2 Puffs by mouth every 6 hours as needed for Shortness of Breath. 8.5 g 3   • aspirin (ASA) 81 MG CHEW Take 1 Tab by mouth every day. (Patient not taking: Reported on 1/21/2019) 100 Tab 11     No current facility-administered medications for this visit.      Allergic rhinitis  2007 did see  for allergy testing     BPH  Saw dr.garey aldrich urology had microwave procedure   3/8/13 psa 2.5 per urology note  5/1/14 psa 1.8  6/11/15 urology [place] note, s/p TUMT several years ago, psa stable, digital rectal exam normal, followup one year  4/26/16 psa 2.7  10/23/17 psa 2.1  2/13/18 sees dr.garey aldrich  2/26/20 [facility] note  bph on flomax 0.4 mg qday, psa decreased from 4.2 to 2.2, will try off flomax and do cystoscopy and uroflow next visit     decrease gfr  5/1/14 bun 20,creat 1.32,GFR 55  4/22/16 bun 22,creat 1.19,GFR>60  10/19/17 bun 24,creat 1.21  4/5/18 bun 25,creat 1.27  2/19/19 bun 24,creat 1.32,GFR 57     dyspnea  10/24/17 apnea link, UA repeat consider renal ultrasound if RBC persists, patient does complain of occasional shortness of breath, chest x-ray, echo, treadmill stress test ordered  11/14/17 exercise treadmill stress test done at [facility] good exercise tolerance, no ST-T wave abnormalities, hypertensive response at baseline and with exercise, no chest pain, clive protocol 12 minutes  11/14/17 echo done at [facility] normal left ventricle size and function, EF 50-55%, impaired diastolic filling, no significant valvular disease     Dyslipidemia  6/09 exercise thallium at [facility], no ischemia, EF 63%  4/11 chol 220,trig 204,hdl 41,ldl 138  5/1/14 chol 191,trig 76,hdl 46,ldl 130,LDL-P 1303,HDL-P 27.9,LP-IR 49; CRP 1.2, Lp(a) 67, urine mac < 0.5  5/16/14 [facility]; treadmill stress test 85% maximum predicted heart rate, no ST depression, solitary PVC, no arrhythmia, negative stress test  5/16/14 CT cardiac scoring; LMA-0, LCX-1.7, LAD 10.8, RCA-6.3, PDA-0; total calcium score 18.8  5/16/15 carotid ultrasound <50% stenosis  5/16/14 DES negative  5/16/14 ultrasound aorta negative  4/26/16 chol 219,trig 134,hdl 42,ldl 150  10/23/17 chol 219,trig 160,hdl 41,ldl 146  4/9/18 chol 229,trig 93,hdl 44,ldl 166 start lipitor 20 mg  2/20/19 chol 155,trig 84,hdl 42,ldl 96 on lipitor 20 mg     Gastroesophageal reflux  On prilosec daily  3/10 EGD per [facility]      hypertension  11/14/17 exercise treadmill stress test done at [facility] good exercise tolerance, no ST-T wave abnormalities, hypertensive response at baseline and with exercise, no chest pain, clive protocol 12 minutes  11/14/17 echo done at [facility] normal  left ventricle size and function, EF 50-55%, impaired diastolic filling, no significant valvular disease  2/13/18 start lisinopril 10 mg  4/9/18 increase lisinopril to 10 mg bid  5/1/18 stop lisinopril cough, change to losartan 50 mg  Lisinopril cough     Impaired fasting blood sugar  10/23/17 A1c 6.%,bs 113  4/9/18 A1c 6.2%  2/20/19 A1c 6.3%     Mild intermittent asthma  2008 on advair  10/24/17 on advair 250/50 bid      Polycythemia  5/1/14 hgb 18,hct 53.2  4/26/16 hgb 18,hct 51.7  10/23/17 hgb 18,hct 51.7  11/14/17 echo done at [facility] normal left ventricle size and function, EF 50-55%, impaired diastolic filling, no significant valvular disease  4/9/18 hgb 17.7,hct 51,EPO 10,b12 294  5/17/18 [facility] pulmonary note AHI 21.4, low saturation 76% average 88%, autopap 5-15 through [facility]  2/20/19 hgb 18,hct 53     Preventative health  2010 pneumovax no webiz  2/7/12 tdap at [facility]   8/9/12 hep a and b series completed  7/15/14 zoster vaccine  10/23/17 vit d 26 start 2000 units  3/20/19 colon per [facility] negative, repeat 10 years  2/26/20  urology note bph on flomax 0.4 mg qday, psa decreased from 4.2 to 2.2, will try off flomax and do cystoscopy and uroflow next visit      Left knee arthroscopy 2010     Sleep apnea  had ENT surgery/uvulectomy  7/21/14 apnea link AHI 5,RI 7,hypopnea index 5, evaluation over 5 hours 44 min  8/7/14 patient declines sleep study  5/17/18 [facility] pulmonary note AHI 21.4, low saturation 76% average 88%, autopap 5-15 through [facility]     s/p sinus surgery       Patient Active Problem List   Diagnosis   • Mild intermittent asthma   • Allergic rhinitis   • S/P arthroscopy of left knee   • S/P sinus surgery   • Preventative health care   • BPH (benign prostatic hypertrophy)   • GERD (gastroesophageal reflux disease)   • Dyslipidemia   • Sleep apnea   • Obesity   • Polycythemia   • Impaired fasting glucose   • Dyspnea   • Hypertension   • Bee sting allergy   • Decreased GFR             Health Maintenance Summary                HEPATITIS C SCREENING Overdue 1955     IMM ZOSTER VACCINES Overdue 9/9/2014      Done 7/15/2014 Imm Admin: Zoster Vaccine Live (ZVL) (Zostavax)    IMM PNEUMOCOCCAL VACCINE: 65+ Years Overdue 8/14/2019     IMM INFLUENZA Next Due 9/1/2020      Done 1/21/2019 Imm Admin: Influenza Vaccine Quad Inj (Pf)     Patient has more history with this topic...    IMM DTaP/Tdap/Td Vaccine Next Due 2/7/2022      Done 2/7/2012 Ext Imm: Tdap    COLONOSCOPY Next Due 3/19/2029      Done 3/19/2019 REFERRAL TO GI FOR COLONOSCOPY     Patient has more history with this topic...          Patient Care Team:  Oj Estrada M.D. as PCP - General    ROS       Objective:          Physical Exam  Vitals signs and nursing note reviewed.   Constitutional:       Appearance: Normal appearance.   HENT:      Head: Normocephalic and atraumatic.   Eyes:      Conjunctiva/sclera: Conjunctivae normal.   Neck:      Musculoskeletal: Neck supple.   Cardiovascular:      Rate and Rhythm: Normal rate and regular rhythm.   Pulmonary:      Effort: No respiratory distress.      Breath sounds: Normal breath sounds.   Abdominal:      General: There is no distension.   Musculoskeletal:         General: No swelling.   Skin:     General: Skin is warm.   Neurological:      General: No focal deficit present.      Mental Status: He is alert.   Psychiatric:         Mood and Affect: Mood normal.         Behavior: Behavior normal.                 Assessment/Plan:        Assessment  #! Hypertension stable and controlled on losartan    #2  Dyslipidemia on Lipitor    #3 reflux on Prilosec    #4 mild intermittent asthma on Advair no breakthrough use of rescue inhaler    #5 BPH followed by urology    #6 obesity BMI 38.2. Has declined nutrition consultation    Plan  #! Flu vaccine to get at pharmacy, prevnar, shingrix since he is out of network he will get that at the pharmacy    #2  Labs ordered to be done at [facility]  including COVID testing, hepatitis C antibody screening    #3 check blood pressure periodically continue losartan    #4 nutrition, diet, exercise discussed work on weight loss, weight loss is important for decreasing risk of cardiac disease, diabetes, this will help his blood sugar, cholesterol, blood pressure    #5 follow-up urology    #6 follow-up 6 months    #7 influenza vaccine when available    #8 continue social distancing, mask wearing in public

## 2020-11-23 ENCOUNTER — TELEPHONE (OUTPATIENT)
Dept: MEDICAL GROUP | Facility: MEDICAL CENTER | Age: 65
End: 2020-11-23

## 2020-11-23 DIAGNOSIS — R73.01 IMPAIRED FASTING GLUCOSE: ICD-10-CM

## 2020-11-23 DIAGNOSIS — R94.4 DECREASED GFR: ICD-10-CM

## 2020-11-23 DIAGNOSIS — E55.9 VITAMIN D DEFICIENCY: ICD-10-CM

## 2020-11-23 LAB
ALBUMIN SERPL-MCNC: 4.1 G/DL (ref 3.8–4.8)
ALBUMIN/CREAT UR: 13 MG/G CREAT (ref 0–29)
ALBUMIN/GLOB SERPL: 2.1 {RATIO} (ref 1.2–2.2)
ALP SERPL-CCNC: 96 IU/L (ref 39–117)
ALT SERPL-CCNC: 26 IU/L (ref 0–44)
APPEARANCE UR: CLEAR
AST SERPL-CCNC: 21 IU/L (ref 0–40)
BACTERIA #/AREA URNS HPF: NORMAL /[HPF]
BASOPHILS # BLD AUTO: 0 X10E3/UL (ref 0–0.2)
BASOPHILS NFR BLD AUTO: 1 %
BILIRUB SERPL-MCNC: 0.3 MG/DL (ref 0–1.2)
BILIRUB UR QL STRIP: NEGATIVE
BUN SERPL-MCNC: 19 MG/DL (ref 8–27)
BUN/CREAT SERPL: 14 (ref 10–24)
CALCIUM SERPL-MCNC: 9.4 MG/DL (ref 8.6–10.2)
CASTS URNS MICRO: NORMAL
CASTS URNS QL MICRO: NORMAL
CHLORIDE SERPL-SCNC: 105 MMOL/L (ref 96–106)
CHOLEST SERPL-MCNC: 170 MG/DL (ref 100–199)
CO2 SERPL-SCNC: 24 MMOL/L (ref 20–29)
COLOR UR: YELLOW
CREAT SERPL-MCNC: 1.33 MG/DL (ref 0.76–1.27)
CREAT UR-MCNC: 79.9 MG/DL
CRYSTALS URNS MICRO: NORMAL
EOSINOPHIL # BLD AUTO: 0.2 X10E3/UL (ref 0–0.4)
EOSINOPHIL NFR BLD AUTO: 4 %
EPI CELLS #/AREA URNS HPF: NORMAL /HPF (ref 0–10)
ERYTHROCYTE [DISTWIDTH] IN BLOOD BY AUTOMATED COUNT: 12.7 % (ref 11.6–15.4)
GLOBULIN SER CALC-MCNC: 2 G/DL (ref 1.5–4.5)
GLUCOSE SERPL-MCNC: 140 MG/DL (ref 65–99)
GLUCOSE UR QL: NEGATIVE
HBA1C MFR BLD: 6.4 % (ref 4.8–5.6)
HCT VFR BLD AUTO: 53.1 % (ref 37.5–51)
HCV AB S/CO SERPL IA: <0.1 S/CO RATIO (ref 0–0.9)
HDLC SERPL-MCNC: 48 MG/DL
HGB BLD-MCNC: 18.4 G/DL (ref 13–17.7)
HGB UR QL STRIP: NEGATIVE
IMM GRANULOCYTES # BLD AUTO: 0 X10E3/UL (ref 0–0.1)
IMM GRANULOCYTES NFR BLD AUTO: 0 %
IMMATURE CELLS  115398: ABNORMAL
KETONES UR QL STRIP: NEGATIVE
LABORATORY COMMENT REPORT: ABNORMAL
LDLC SERPL CALC-MCNC: 94 MG/DL (ref 0–99)
LEUKOCYTE ESTERASE UR QL STRIP: NEGATIVE
LYMPHOCYTES # BLD AUTO: 2.1 X10E3/UL (ref 0.7–3.1)
LYMPHOCYTES NFR BLD AUTO: 31 %
MCH RBC QN AUTO: 32.1 PG (ref 26.6–33)
MCHC RBC AUTO-ENTMCNC: 34.7 G/DL (ref 31.5–35.7)
MCV RBC AUTO: 93 FL (ref 79–97)
MICRO URNS: NORMAL
MICRO URNS: NORMAL
MICROALBUMIN UR-MCNC: 10.1 UG/ML
MONOCYTES # BLD AUTO: 0.4 X10E3/UL (ref 0.1–0.9)
MONOCYTES NFR BLD AUTO: 6 %
MORPHOLOGY BLD-IMP: ABNORMAL
MUCOUS THREADS URNS QL MICRO: PRESENT
NEUTROPHILS # BLD AUTO: 4.1 X10E3/UL (ref 1.4–7)
NEUTROPHILS NFR BLD AUTO: 58 %
NITRITE UR QL STRIP: NEGATIVE
NRBC BLD AUTO-RTO: ABNORMAL %
PH UR STRIP: 6 [PH] (ref 5–7.5)
PLATELET # BLD AUTO: 182 X10E3/UL (ref 150–450)
POTASSIUM SERPL-SCNC: 4.6 MMOL/L (ref 3.5–5.2)
PROT SERPL-MCNC: 6.1 G/DL (ref 6–8.5)
PROT UR QL STRIP: NEGATIVE
PSA SERPL-MCNC: 2.3 NG/ML (ref 0–4)
RBC # BLD AUTO: 5.73 X10E6/UL (ref 4.14–5.8)
RBC #/AREA URNS HPF: NORMAL /HPF (ref 0–2)
RENAL EPI CELLS #/AREA URNS HPF: NORMAL /[HPF]
SARS-COV-2 IGG SERPL QL IA: NEGATIVE
SARS-COV-2 IGG SERPL QL IA: NORMAL
SODIUM SERPL-SCNC: 141 MMOL/L (ref 134–144)
SP GR UR: 1.02 (ref 1–1.03)
T VAGINALIS URNS QL MICRO: NORMAL
TRIGL SERPL-MCNC: 163 MG/DL (ref 0–149)
TSH SERPL DL<=0.005 MIU/L-ACNC: 1.6 UIU/ML (ref 0.45–4.5)
UNIDENT CRYS URNS QL MICRO: NORMAL
URINALYSIS REFLEX  377202: NORMAL
URNS CMNT MICRO: NORMAL
UROBILINOGEN UR STRIP-MCNC: 0.2 MG/DL (ref 0.2–1)
VLDLC SERPL CALC-MCNC: 28 MG/DL (ref 5–40)
WBC # BLD AUTO: 6.9 X10E3/UL (ref 3.4–10.8)
WBC #/AREA URNS HPF: NORMAL /HPF (ref 0–5)
YEAST #/AREA URNS HPF: NORMAL /[HPF]

## 2020-11-24 ENCOUNTER — TELEPHONE (OUTPATIENT)
Dept: MEDICAL GROUP | Facility: MEDICAL CENTER | Age: 65
End: 2020-11-24

## 2020-11-24 NOTE — TELEPHONE ENCOUNTER
----- Message from Oj Estrada M.D. sent at 11/23/2020 10:41 PM PST -----  Called patient, unable to leave message.  Please notify him that his test shows:  (1) his kidney function is stable compared to last year, it is still slightly decreased but there is no significant change, have him continue to avoid anti-inflammatories as much as possible such as Motrin, Aleve, Advil, naproxen, ibuprofen as those may irritate the kidneys  (2) his total cholesterol is 170, good cholesterol is 48 (goal is above 40), bad cholesterol is 94 (goal is less than 100), have him continue the cholesterol medication  (3) his diabetes test is slightly higher at 6.4%, up from 6.3%, he does not have diabetes but still has prediabetes, he does not need medications yet but we need to monitor his blood sugar test, have him continue to work on nutrition, exercise, and weight loss, we need to repeat his diabetes test in 3 months if it continues to worsen we may need to consider medications  (4) his thyroid test and prostate blood cancer test is negative  (5) his Covid antibody test is negative indicating he was not infected previously  (6) we can repeat his blood test in 3 months, I will place that order in the computer system, if he needs a labs printed, please print those out and mail the labs to him to be done in 3 months

## 2020-11-24 NOTE — TELEPHONE ENCOUNTER
Called patient, unable to leave message.  Please notify him that his test shows:  (1) his kidney function is stable compared to last year, it is still slightly decreased but there is no significant change, have him continue to avoid anti-inflammatories as much as possible such as Motrin, Aleve, Advil, naproxen, ibuprofen as those may irritate the kidneys  (2) his total cholesterol is 170, good cholesterol is 48 (goal is above 40), bad cholesterol is 94 (goal is less than 100), have him continue the cholesterol medication  (3) his diabetes test is slightly higher at 6.4%, up from 6.3%, he does not have diabetes but still has prediabetes, he does not need medications yet but we need to monitor his blood sugar test, have him continue to work on nutrition, exercise, and weight loss, we need to repeat his diabetes test in 3 months if it continues to worsen we may need to consider medications  (4) his thyroid test and prostate blood cancer test is negative  (5) his Covid antibody test is negative indicating he was not infected previously  (6) we can repeat his blood test in 3 months, I will place that order in the computer system, if he needs a labs printed, please print those out and mail the labs to him to be done in 3 months

## 2020-12-01 ENCOUNTER — TELEPHONE (OUTPATIENT)
Dept: MEDICAL GROUP | Facility: MEDICAL CENTER | Age: 65
End: 2020-12-01

## 2020-12-01 RX ORDER — EPINEPHRINE 0.3 MG/.3ML
0.3 INJECTION SUBCUTANEOUS ONCE
Qty: 0.3 ML | Refills: 1 | Status: SHIPPED | OUTPATIENT
Start: 2020-12-01 | End: 2020-12-01

## 2020-12-01 NOTE — TELEPHONE ENCOUNTER
Epi Pen      Received request via: Patient    Was the patient seen in the last year in this department? Yes    Does the patient have an active prescription (recently filled or refills available) for medication(s) requested? No

## 2020-12-07 ENCOUNTER — TELEPHONE (OUTPATIENT)
Dept: MEDICAL GROUP | Facility: MEDICAL CENTER | Age: 65
End: 2020-12-07

## 2020-12-07 DIAGNOSIS — G47.30 SLEEP APNEA, UNSPECIFIED TYPE: ICD-10-CM

## 2020-12-07 RX ORDER — EPINEPHRINE 0.3 MG/.3ML
0.3 INJECTION SUBCUTANEOUS ONCE
Qty: 0.3 ML | Refills: 1 | Status: SHIPPED | OUTPATIENT
Start: 2020-12-07 | End: 2020-12-07

## 2020-12-07 NOTE — TELEPHONE ENCOUNTER
Pt does not remember who he got his previous machine from.     Pt would like you to send a REF to [facility].

## 2020-12-07 NOTE — TELEPHONE ENCOUNTER
Joaquin Escoto  [phone]    Pt called an LM, would like you to order him a sleep apnea machine. Please advise.

## 2020-12-07 NOTE — TELEPHONE ENCOUNTER
Which company gave him his previous sleep apnea machine?      He needs to follow-up with the [facility] sleep [facility], the insurance likely will not approve a new machine as they require documentation that he has been seen for this on a regular basis.

## 2020-12-08 NOTE — TELEPHONE ENCOUNTER
I believe  I found an old reference in his [facility] sleep  records indicating that his old CPAP machine was through [employer]? I will submit the order for the new CPAP machine to [facility] although again this may need to be done by his sleep specialist.  I will also place a referral for him to see the [facility] sleep specialist.

## 2021-03-03 DIAGNOSIS — Z23 NEED FOR VACCINATION: ICD-10-CM

## 2021-03-10 DIAGNOSIS — Z90.79 S/P TURP: ICD-10-CM

## 2021-03-10 RX ORDER — TAMSULOSIN HYDROCHLORIDE 0.4 MG/1
CAPSULE ORAL
COMMUNITY

## 2021-04-07 ENCOUNTER — OFFICE VISIT (OUTPATIENT)
Dept: MEDICAL GROUP | Facility: MEDICAL CENTER | Age: 66
End: 2021-04-07

## 2021-04-07 VITALS
DIASTOLIC BLOOD PRESSURE: 84 MMHG | TEMPERATURE: 97 F | HEART RATE: 89 BPM | BODY MASS INDEX: 39.16 KG/M2 | OXYGEN SATURATION: 96 % | SYSTOLIC BLOOD PRESSURE: 120 MMHG | HEIGHT: 63 IN | WEIGHT: 221 LBS

## 2021-04-07 DIAGNOSIS — L03.116 CELLULITIS OF LEFT LEG: ICD-10-CM

## 2021-04-07 DIAGNOSIS — R73.03 PREDIABETES: ICD-10-CM

## 2021-04-07 PROCEDURE — 99214 OFFICE O/P EST MOD 30 MIN: CPT | Performed by: INTERNAL MEDICINE

## 2021-04-07 RX ORDER — CEPHALEXIN 500 MG/1
500 CAPSULE ORAL 4 TIMES DAILY
Qty: 28 CAPSULE | Refills: 0 | Status: SHIPPED | OUTPATIENT
Start: 2021-04-07

## 2021-04-07 ASSESSMENT — FIBROSIS 4 INDEX: FIB4 SCORE: 1.49

## 2021-04-07 NOTE — PROGRESS NOTES
Established Patient    Joaquin presents today with the following:    CC:  Leg wound infection    HPI:   Joaquin is a 65 y.o. male who came in for above.    He scraped his left shin over 2 weeks ago. He [place] a [place] in [place] with the opened wound. It started to seem infected last Thursday and went to urgent care 3 days ago on Sunday. He got ceftriaxone one dose, given bactrim DS and mupirocin cream. So far, he does not notice any improvement.  Denies fever, chills, lymphadenopathy. No discharge, just brownish scab normally.      ROS:   As above    Patient Active Problem List    Diagnosis Date Noted   • Decreased GFR 02/20/2019   • Bee sting allergy 09/28/2018   • Hypertension 02/13/2018   • Dyspnea 12/20/2017   • Impaired fasting glucose 10/23/2017   • Polycythemia 04/26/2016   • Obesity 12/15/2015   • Sleep apnea    • Dyslipidemia 11/03/2012   • Mild intermittent asthma 10/23/2012   • Allergic rhinitis 10/23/2012   • S/P arthroscopy of left knee 10/23/2012   • S/P sinus surgery 10/23/2012   • Preventative health care 10/23/2012   • S/P TURP 10/23/2012   • GERD (gastroesophageal reflux disease) 10/23/2012       Current Outpatient Medications   Medication Sig Dispense Refill   • cephALEXin (KEFLEX) 500 MG Cap Take 1 capsule by mouth 4 times a day. 28 capsule 0   • losartan (COZAAR) 50 MG Tab TAKE ONE TABLET BY MOUTH EVERY DAY 90 tablet 1   • atorvastatin (LIPITOR) 20 MG Tab TAKE ONE TABLET BY MOUTH EVERY DAY 90 tablet 1   • tamsulosin (FLOMAX) 0.4 MG capsule tamsulosin 0.4 mg capsule   Take 1 capsule every day by oral route in the morning for 30 days.     • omeprazole (PRILOSEC) 20 MG delayed-release capsule TAKE ONE CAPSULE BY MOUTH EVERY DAY 90 Cap 3   • fluticasone-salmeterol (ADVAIR) 100-50 MCG/DOSE AEROSOL POWDER, BREATH ACTIVATED Inhale 1 Puff by mouth 2 times a day. 180 Each 3   • albuterol 108 (90 Base) MCG/ACT Aero Soln inhalation aerosol Inhale 2 Puffs by mouth every 6 hours as needed for Shortness  "of Breath. 8.5 g 3   • aspirin (ASA) 81 MG CHEW Take 1 Tab by mouth every day. (Patient not taking: Reported on 1/21/2019) 100 Tab 11     No current facility-administered medications for this visit.         /84 (BP Location: Left arm, Patient Position: Sitting)   Pulse 89   Temp 36.1 °C (97 °F) (Temporal)   Ht 1.6 m (5' 3\")   Wt 100 kg (221 lb)   SpO2 96%   BMI 39.15 kg/m²     Physical Exam  General: Alert and oriented, No apparent distress.  Skin: gaping longitudinal wound with yellowish scab without discharge on exam. Erythema and tenderness on exam. No fluid collection.        Assessment and Plan    1. Cellulitis of left leg  Bactrim does not cover strep well which may be the reason why it is not improving.   He has history of penicillin allergy when he was young and was told to have breathing difficulty. However, he took his wife's amoxillicin when infection started without side effect or improvement.  - will add on keflex for strep coverage. Continue bactrim for MRSA/MSSA coverage. Counseled possible Abx SE. No risk factor for resistant bacteria currently. He is borderline diabetic A1C 6.4 which could be another factor for delayed healing.   - follow up next week if no improvement to consider wound Cx and wound care referral.  - ER precautions for worsening infection. Td updated 6 months ago.    - cephALEXin (KEFLEX) 500 MG Cap; Take 1 capsule by mouth 4 times a day.  Dispense: 28 capsule; Refill: 0    2. Prediabetes  - follow up with Dr Estrada.      Return if symptoms worsen or fail to improve.         Signed by: Lorena Will M.D.  "

## 2021-06-15 ENCOUNTER — NURSE TRIAGE (OUTPATIENT)
Dept: HEALTH INFORMATION MANAGEMENT | Facility: OTHER | Age: 66
End: 2021-06-15

## 2021-06-15 NOTE — TELEPHONE ENCOUNTER
"Chest discomfort & over past 6 months does not have wind he used to have.  Upper left of chest discomfort for a couple of days.      Reason for Disposition  • Chest pain lasting longer than 5 minutes    Additional Information  • Negative: SEVERE difficulty breathing (e.g., struggling for each breath, speaks in single words)  • Negative: Passed out (i.e., fainted, collapsed and was not responding)  • Negative: Chest pain lasting longer than 5 minutes and ANY of the following:* Over 50 years old* Over 30 years old and at least one cardiac risk factor (i.e., high blood pressure, diabetes, high cholesterol, obesity, smoker or strong family history of heart disease)* Pain is crushing, pressure-like, or heavy * Took nitroglycerin and chest pain was not relieved* History of heart disease (i.e., angina, heart attack, bypass surgery, angioplasty, CHF)  • Negative: Visible sweat on face or sweat dripping down face  • Negative: Sounds like a life-threatening emergency to the triager  • Negative: Followed an injury to chest  • Negative: SEVERE chest pain  • Negative: Pain also present in shoulder(s) or arm(s) or jaw  • Negative: Difficulty breathing  • Negative: Cocaine use within last 3 days  • Negative: History of prior 'blood clot' in leg or lungs (i.e., deep vein thrombosis, pulmonary embolism)  • Negative: Recent illness requiring prolonged bed rest (i.e., immobilization)  • Negative: Hip or leg fracture in past 2 months (e.g, or had cast on leg or ankle)  • Negative: Major surgery in the past month  • Negative: Recent long-distance travel with prolonged time in car, bus, plane, or train (i.e., within past 2 weeks; 6 or more hours duration)  • Negative: Heart beating irregularly or very rapidly    Answer Assessment - Initial Assessment Questions  1. LOCATION: \"Where does it hurt?\"        More discomfort than pain, it is in the upper left chest, around breast area  2. RADIATION: \"Does the pain go anywhere else?\" (e.g., into " "neck, jaw, arms, back)      no  3. ONSET: \"When did the chest pain begin?\" (Minutes, hours or days)       6/13 @ 5 p.m.  4. PATTERN \"Does the pain come and go, or has it been constant since it started?\"  \"Does it get worse with exertion?\"       Comes & goes  5. DURATION: \"How long does it last\" (e.g., seconds, minutes, hours)      Yesterday from 5 a.m. until 10 p.m.  6. SEVERITY: \"How bad is the pain?\"  (e.g., Scale 1-10; mild, moderate, or severe)     - MILD (1-3): doesn't interfere with normal activities      - MODERATE (4-7): interferes with normal activities or awakens from sleep     - SEVERE (8-10): excruciating pain, unable to do any normal activities        3  7. CARDIAC RISK FACTORS: \"Do you have any history of heart problems or risk factors for heart disease?\" (e.g., prior heart attack, angina; high blood pressure, diabetes, being overweight, high cholesterol, smoking, or strong family history of heart disease)      Family history, HTN, prediabetes, overweight  8. PULMONARY RISK FACTORS: \"Do you have any history of lung disease?\"  (e.g., blood clots in lung, asthma, emphysema, birth control pills)      asthma  9. CAUSE: \"What do you think is causing the chest pain?\"      Issue w/heart  10. OTHER SYMPTOMS: \"Do you have any other symptoms?\" (e.g., dizziness, nausea, vomiting, sweating, fever, difficulty breathing, cough)        Lack of stamina related to breathing, ongoing for 6 months, progressively getting worse.    11. PREGNANCY: \"Is there any chance you are pregnant?\" \"When was your last menstrual period?\"        NA    Protocols used: CHEST PAIN-A-[place]      "

## 2021-06-15 NOTE — TELEPHONE ENCOUNTER
Regarding: NEXT LEVEL CARE  ----- Message from Janae Gallardo sent at 6/15/2021 11:08 AM PDT -----  CHEST PAIN AND DIFFICULTY BREATHING

## 2021-10-06 ENCOUNTER — TELEPHONE (OUTPATIENT)
Dept: MEDICAL GROUP | Facility: MEDICAL CENTER | Age: 66
End: 2021-10-06

## 2021-10-06 DIAGNOSIS — J45.20 MILD INTERMITTENT ASTHMA, UNCOMPLICATED: ICD-10-CM

## 2021-10-06 DIAGNOSIS — J45.909 REACTIVE AIRWAY DISEASE WITHOUT COMPLICATION, UNSPECIFIED ASTHMA SEVERITY, UNSPECIFIED WHETHER PERSISTENT: ICD-10-CM

## 2021-10-06 NOTE — TELEPHONE ENCOUNTER
Please notify the patient I have not seen him in over a year, have him schedule follow-up appointment.

## 2021-11-18 ENCOUNTER — TELEPHONE (OUTPATIENT)
Dept: MEDICAL GROUP | Facility: MEDICAL CENTER | Age: 66
End: 2021-11-18

## 2021-11-18 DIAGNOSIS — J45.20 MILD INTERMITTENT ASTHMA, UNCOMPLICATED: ICD-10-CM

## 2021-11-18 DIAGNOSIS — J45.909 REACTIVE AIRWAY DISEASE WITHOUT COMPLICATION, UNSPECIFIED ASTHMA SEVERITY, UNSPECIFIED WHETHER PERSISTENT: ICD-10-CM

## 2021-11-18 DIAGNOSIS — K21.9 GASTROESOPHAGEAL REFLUX DISEASE WITHOUT ESOPHAGITIS: ICD-10-CM

## 2021-11-18 RX ORDER — OMEPRAZOLE 20 MG/1
CAPSULE, DELAYED RELEASE ORAL
Qty: 30 CAPSULE | Refills: 1 | Status: SHIPPED | OUTPATIENT
Start: 2021-11-18 | End: 2021-12-12

## 2021-11-18 NOTE — TELEPHONE ENCOUNTER
Please notify the patient I have not seen him since August 2020 and he needs to schedule an appointment.  I cannot keep refilling his medications unless he is seen.

## 2021-11-18 NOTE — TELEPHONE ENCOUNTER
Received request via: Pharmacy    Was the patient seen in the last year in this department? Yes    Does the patient have an active prescription (recently filled or refills available) for medication(s) requested? No   The [facility] order in work queue [id number] requested on 9/14/2020 has been removed as unable to contact. Ordering provider has been notified..    Please contact patient if further communication is needed.

## 2021-11-29 ENCOUNTER — TELEPHONE (OUTPATIENT)
Dept: MEDICAL GROUP | Facility: MEDICAL CENTER | Age: 66
End: 2021-11-29

## 2021-11-29 NOTE — TELEPHONE ENCOUNTER
Joaquin Andrade [facility]  [phone] (home) [phone] (work)    Pt called and LM. States that he was bitten by his indoor cat and it was starting to get infected and wanted ABX called into [facility] on [address]. I discussed with Dr Estrada and he stated that the pt needed to be seen. Called pt back to get him scheduled and he said that he had some old ABX at home and he just took them and it cleared up the infection from his cat bite.

## 2021-12-27 PROBLEM — I25.2 HISTORY OF MI (MYOCARDIAL INFARCTION): Status: ACTIVE | Noted: 2021-12-27

## 2022-01-26 ENCOUNTER — TELEPHONE (OUTPATIENT)
Dept: MEDICAL GROUP | Facility: MEDICAL CENTER | Age: 67
End: 2022-01-26

## 2022-01-26 DIAGNOSIS — K21.9 GASTROESOPHAGEAL REFLUX DISEASE WITHOUT ESOPHAGITIS: ICD-10-CM

## 2022-01-26 RX ORDER — OMEPRAZOLE 20 MG/1
CAPSULE, DELAYED RELEASE ORAL
Qty: 30 CAPSULE | Refills: 0 | Status: SHIPPED | OUTPATIENT
Start: 2022-01-26

## 2022-01-26 NOTE — LETTER
January 27, 2022        Joaquin Escoto  [address]  [place] [address]        Dear Joaquin:    Please be advised that our office has tried to reach you by phone several times, without success.    Please note that Dr. Estrada has a message for you. Please call our office at your convenience, you can speak with any Medical Assistant on staff to get this message from your provider.          If you have any questions or concerns, please don't hesitate to call.        Sincerely,        Oj Estrada M.D.    Electronically Signed

## 2022-01-27 NOTE — TELEPHONE ENCOUNTER
Please notify the patient he needs to schedule an appointment, I believe he did not show up for his last appointment, and I have not seen him in over a year.  I cannot keep refilling his medications without seeing him.

## 2022-06-07 DIAGNOSIS — K21.9 GASTROESOPHAGEAL REFLUX DISEASE WITHOUT ESOPHAGITIS: ICD-10-CM

## 2022-06-07 RX ORDER — OMEPRAZOLE 20 MG/1
CAPSULE, DELAYED RELEASE ORAL
Qty: 30 CAPSULE | OUTPATIENT
Start: 2022-06-07
